# Patient Record
Sex: MALE | Race: WHITE | NOT HISPANIC OR LATINO
[De-identification: names, ages, dates, MRNs, and addresses within clinical notes are randomized per-mention and may not be internally consistent; named-entity substitution may affect disease eponyms.]

---

## 2017-01-03 ENCOUNTER — RX RENEWAL (OUTPATIENT)
Age: 57
End: 2017-01-03

## 2017-06-21 ENCOUNTER — APPOINTMENT (OUTPATIENT)
Dept: CARDIOLOGY | Facility: CLINIC | Age: 57
End: 2017-06-21

## 2017-06-21 ENCOUNTER — NON-APPOINTMENT (OUTPATIENT)
Age: 57
End: 2017-06-21

## 2017-06-21 VITALS
HEIGHT: 68 IN | DIASTOLIC BLOOD PRESSURE: 88 MMHG | WEIGHT: 220 LBS | HEART RATE: 65 BPM | OXYGEN SATURATION: 97 % | SYSTOLIC BLOOD PRESSURE: 135 MMHG | BODY MASS INDEX: 33.34 KG/M2

## 2017-06-21 LAB
ALBUMIN SERPL ELPH-MCNC: 4.4 G/DL
ALP BLD-CCNC: 60 U/L
ALT SERPL-CCNC: 48 U/L
ANION GAP SERPL CALC-SCNC: 20 MMOL/L
AST SERPL-CCNC: 26 U/L
BASOPHILS # BLD AUTO: 0.06 K/UL
BASOPHILS NFR BLD AUTO: 1 %
BILIRUB SERPL-MCNC: 0.6 MG/DL
BUN SERPL-MCNC: 19 MG/DL
CALCIUM SERPL-MCNC: 9.6 MG/DL
CHLORIDE SERPL-SCNC: 101 MMOL/L
CHOLEST SERPL-MCNC: 179 MG/DL
CHOLEST/HDLC SERPL: 3.2 RATIO
CO2 SERPL-SCNC: 24 MMOL/L
CREAT SERPL-MCNC: 0.99 MG/DL
EOSINOPHIL # BLD AUTO: 0.04 K/UL
EOSINOPHIL NFR BLD AUTO: 0.7 %
ESTIMATED AVERAGE GLUCOSE: 117 MG/DL
GLUCOSE SERPL-MCNC: 110 MG/DL
HBA1C MFR BLD HPLC: 5.7 %
HCT VFR BLD CALC: 45 %
HDLC SERPL-MCNC: 56 MG/DL
HGB BLD-MCNC: 14.8 G/DL
IMM GRANULOCYTES NFR BLD AUTO: 0.3 %
LDLC SERPL CALC-MCNC: 107 MG/DL
LYMPHOCYTES # BLD AUTO: 1.58 K/UL
LYMPHOCYTES NFR BLD AUTO: 27.5 %
MAN DIFF?: NORMAL
MCHC RBC-ENTMCNC: 28.8 PG
MCHC RBC-ENTMCNC: 32.9 GM/DL
MCV RBC AUTO: 87.7 FL
MONOCYTES # BLD AUTO: 0.34 K/UL
MONOCYTES NFR BLD AUTO: 5.9 %
NEUTROPHILS # BLD AUTO: 3.7 K/UL
NEUTROPHILS NFR BLD AUTO: 64.6 %
PLATELET # BLD AUTO: 203 K/UL
POTASSIUM SERPL-SCNC: 4.6 MMOL/L
PROT SERPL-MCNC: 7.1 G/DL
PSA FREE FLD-MCNC: 24.1
PSA FREE SERPL-MCNC: 0.19 NG/ML
PSA SERPL-MCNC: 0.79 NG/ML
RBC # BLD: 5.13 M/UL
RBC # FLD: 14.7 %
SODIUM SERPL-SCNC: 145 MMOL/L
T4 SERPL-MCNC: 7.5 UG/DL
TRIGL SERPL-MCNC: 79 MG/DL
TSH SERPL-ACNC: 0.94 UIU/ML
WBC # FLD AUTO: 5.74 K/UL

## 2017-09-04 ENCOUNTER — RX RENEWAL (OUTPATIENT)
Age: 57
End: 2017-09-04

## 2017-10-14 ENCOUNTER — RX RENEWAL (OUTPATIENT)
Age: 57
End: 2017-10-14

## 2017-10-25 ENCOUNTER — APPOINTMENT (OUTPATIENT)
Dept: CARDIOLOGY | Facility: CLINIC | Age: 57
End: 2017-10-25
Payer: MEDICARE

## 2017-10-25 ENCOUNTER — NON-APPOINTMENT (OUTPATIENT)
Age: 57
End: 2017-10-25

## 2017-10-25 VITALS
HEART RATE: 63 BPM | OXYGEN SATURATION: 99 % | WEIGHT: 224 LBS | DIASTOLIC BLOOD PRESSURE: 93 MMHG | HEIGHT: 68 IN | BODY MASS INDEX: 33.95 KG/M2 | SYSTOLIC BLOOD PRESSURE: 145 MMHG

## 2017-10-25 VITALS — DIASTOLIC BLOOD PRESSURE: 90 MMHG | SYSTOLIC BLOOD PRESSURE: 128 MMHG

## 2017-10-25 PROCEDURE — 93000 ELECTROCARDIOGRAM COMPLETE: CPT

## 2017-10-25 PROCEDURE — 99214 OFFICE O/P EST MOD 30 MIN: CPT

## 2017-10-25 PROCEDURE — G0008: CPT

## 2017-10-25 PROCEDURE — 90686 IIV4 VACC NO PRSV 0.5 ML IM: CPT

## 2017-11-06 ENCOUNTER — RX RENEWAL (OUTPATIENT)
Age: 57
End: 2017-11-06

## 2017-12-16 ENCOUNTER — RX RENEWAL (OUTPATIENT)
Age: 57
End: 2017-12-16

## 2018-02-28 ENCOUNTER — NON-APPOINTMENT (OUTPATIENT)
Age: 58
End: 2018-02-28

## 2018-02-28 ENCOUNTER — APPOINTMENT (OUTPATIENT)
Dept: CARDIOLOGY | Facility: CLINIC | Age: 58
End: 2018-02-28
Payer: MEDICARE

## 2018-02-28 VITALS
WEIGHT: 229 LBS | HEART RATE: 73 BPM | OXYGEN SATURATION: 100 % | HEIGHT: 68 IN | SYSTOLIC BLOOD PRESSURE: 125 MMHG | BODY MASS INDEX: 34.71 KG/M2 | DIASTOLIC BLOOD PRESSURE: 85 MMHG

## 2018-02-28 PROCEDURE — 99214 OFFICE O/P EST MOD 30 MIN: CPT

## 2018-02-28 PROCEDURE — 93000 ELECTROCARDIOGRAM COMPLETE: CPT

## 2018-03-01 LAB
ALBUMIN SERPL ELPH-MCNC: 4.5 G/DL
ALP BLD-CCNC: 65 U/L
ALT SERPL-CCNC: 63 U/L
ANION GAP SERPL CALC-SCNC: 15 MMOL/L
AST SERPL-CCNC: 32 U/L
BASOPHILS # BLD AUTO: 0.06 K/UL
BASOPHILS NFR BLD AUTO: 1 %
BILIRUB SERPL-MCNC: 0.5 MG/DL
BUN SERPL-MCNC: 16 MG/DL
CALCIUM SERPL-MCNC: 9.8 MG/DL
CHLORIDE SERPL-SCNC: 101 MMOL/L
CHOLEST SERPL-MCNC: 165 MG/DL
CHOLEST/HDLC SERPL: 3.3 RATIO
CO2 SERPL-SCNC: 27 MMOL/L
CREAT SERPL-MCNC: 1.08 MG/DL
EOSINOPHIL # BLD AUTO: 0.04 K/UL
EOSINOPHIL NFR BLD AUTO: 0.7 %
ESTIMATED AVERAGE GLUCOSE: 123 MG/DL
GLUCOSE SERPL-MCNC: 109 MG/DL
HBA1C MFR BLD HPLC: 5.9 %
HCT VFR BLD CALC: 46 %
HDLC SERPL-MCNC: 50 MG/DL
HGB BLD-MCNC: 15.1 G/DL
IMM GRANULOCYTES NFR BLD AUTO: 0.2 %
LDLC SERPL CALC-MCNC: 86 MG/DL
LYMPHOCYTES # BLD AUTO: 1.64 K/UL
LYMPHOCYTES NFR BLD AUTO: 28.4 %
MAN DIFF?: NORMAL
MCHC RBC-ENTMCNC: 29.4 PG
MCHC RBC-ENTMCNC: 32.8 GM/DL
MCV RBC AUTO: 89.7 FL
MONOCYTES # BLD AUTO: 0.3 K/UL
MONOCYTES NFR BLD AUTO: 5.2 %
NEUTROPHILS # BLD AUTO: 3.72 K/UL
NEUTROPHILS NFR BLD AUTO: 64.5 %
PLATELET # BLD AUTO: 180 K/UL
POTASSIUM SERPL-SCNC: 4.7 MMOL/L
PROT SERPL-MCNC: 7.2 G/DL
PSA FREE FLD-MCNC: 22.8
PSA FREE SERPL-MCNC: 0.21 NG/ML
PSA SERPL-MCNC: 0.92 NG/ML
RBC # BLD: 5.13 M/UL
RBC # FLD: 15.1 %
SODIUM SERPL-SCNC: 143 MMOL/L
T4 SERPL-MCNC: 7.5 UG/DL
TRIGL SERPL-MCNC: 146 MG/DL
TSH SERPL-ACNC: 1.68 UIU/ML
WBC # FLD AUTO: 5.77 K/UL

## 2018-07-02 ENCOUNTER — APPOINTMENT (OUTPATIENT)
Dept: CARDIOLOGY | Facility: CLINIC | Age: 58
End: 2018-07-02
Payer: MEDICARE

## 2018-07-02 ENCOUNTER — NON-APPOINTMENT (OUTPATIENT)
Age: 58
End: 2018-07-02

## 2018-07-02 VITALS
HEIGHT: 68 IN | BODY MASS INDEX: 34.71 KG/M2 | OXYGEN SATURATION: 97 % | WEIGHT: 229 LBS | DIASTOLIC BLOOD PRESSURE: 87 MMHG | SYSTOLIC BLOOD PRESSURE: 147 MMHG | HEART RATE: 69 BPM

## 2018-07-02 DIAGNOSIS — R07.89 OTHER CHEST PAIN: ICD-10-CM

## 2018-07-02 PROCEDURE — 99214 OFFICE O/P EST MOD 30 MIN: CPT

## 2018-07-02 PROCEDURE — 93000 ELECTROCARDIOGRAM COMPLETE: CPT

## 2018-07-17 ENCOUNTER — APPOINTMENT (OUTPATIENT)
Dept: CARDIOLOGY | Facility: CLINIC | Age: 58
End: 2018-07-17
Payer: MEDICARE

## 2018-07-17 PROCEDURE — 93015 CV STRESS TEST SUPVJ I&R: CPT

## 2018-12-10 ENCOUNTER — NON-APPOINTMENT (OUTPATIENT)
Age: 58
End: 2018-12-10

## 2018-12-10 ENCOUNTER — APPOINTMENT (OUTPATIENT)
Dept: CARDIOLOGY | Facility: CLINIC | Age: 58
End: 2018-12-10
Payer: MEDICARE

## 2018-12-10 VITALS
HEIGHT: 68 IN | BODY MASS INDEX: 34.56 KG/M2 | OXYGEN SATURATION: 97 % | HEART RATE: 94 BPM | WEIGHT: 228 LBS | SYSTOLIC BLOOD PRESSURE: 123 MMHG | TEMPERATURE: 100.2 F | DIASTOLIC BLOOD PRESSURE: 83 MMHG

## 2018-12-10 PROCEDURE — 93000 ELECTROCARDIOGRAM COMPLETE: CPT

## 2018-12-10 PROCEDURE — 99214 OFFICE O/P EST MOD 30 MIN: CPT

## 2018-12-10 NOTE — REVIEW OF SYSTEMS
[Negative] : Heme/Lymph [Fever] : fever [see HPI] : see HPI [Cough] : cough [Shortness Of Breath] : no shortness of breath [Chest Pain] : no chest pain [Palpitations] : no palpitations

## 2018-12-10 NOTE — DISCUSSION/SUMMARY
[FreeTextEntry1] : The patient was examined. His blood pressure was 123/83, his pulse was 94. His lungs were clear to auscultation. Cardiac exam was negative for murmurs rubs or gallops. His EKG showed sinus rhythm with a left axis deviation... No acute changes were seen. He will maintain all of his  medications.  The patient was told to  return in 4 months, or earlier if needed.

## 2018-12-10 NOTE — REASON FOR VISIT
[FreeTextEntry1] : The patient comes in for followup of his hypertension, hyperlipidemia, prediabetes, and abnormal liver function tests. He has low back pain. He denies any , shortness of breath, or palpitations. Had an episode of numbness on his arms and legs and he saw his neurologist. He was given gabapentin. He gets occasional sharp shooting chest pain. Last night he had chills. This morning he has a cough and a slight fever. His wife is also sick.

## 2018-12-10 NOTE — PHYSICAL EXAM
[General Appearance - Well Developed] : well developed [Normal Appearance] : normal appearance [Well Groomed] : well groomed [General Appearance - Well Nourished] : well nourished [No Deformities] : no deformities [General Appearance - In No Acute Distress] : no acute distress [Normal Conjunctiva] : the conjunctiva exhibited no abnormalities [Eyelids - No Xanthelasma] : the eyelids demonstrated no xanthelasmas [Normal Oral Mucosa] : normal oral mucosa [No Oral Pallor] : no oral pallor [No Oral Cyanosis] : no oral cyanosis [Normal Jugular Venous A Waves Present] : normal jugular venous A waves present [Normal Jugular Venous V Waves Present] : normal jugular venous V waves present [No Jugular Venous Worley A Waves] : no jugular venous worley A waves [Respiration, Rhythm And Depth] : normal respiratory rhythm and effort [Exaggerated Use Of Accessory Muscles For Inspiration] : no accessory muscle use [Auscultation Breath Sounds / Voice Sounds] : lungs were clear to auscultation bilaterally [Heart Rate And Rhythm] : heart rate and rhythm were normal [Heart Sounds] : normal S1 and S2 [Murmurs] : no murmurs present [Abdomen Soft] : soft [Abdomen Tenderness] : non-tender [Abdomen Mass (___ Cm)] : no abdominal mass palpated [Abnormal Walk] : normal gait [Gait - Sufficient For Exercise Testing] : the gait was sufficient for exercise testing [Nail Clubbing] : no clubbing of the fingernails [Cyanosis, Localized] : no localized cyanosis [Petechial Hemorrhages (___cm)] : no petechial hemorrhages [Skin Color & Pigmentation] : normal skin color and pigmentation [] : no rash [No Venous Stasis] : no venous stasis [Skin Lesions] : no skin lesions [No Skin Ulcers] : no skin ulcer [No Xanthoma] : no  xanthoma was observed [Oriented To Time, Place, And Person] : oriented to person, place, and time [Affect] : the affect was normal [Mood] : the mood was normal [No Anxiety] : not feeling anxious

## 2019-03-06 ENCOUNTER — MEDICATION RENEWAL (OUTPATIENT)
Age: 59
End: 2019-03-06

## 2019-03-07 ENCOUNTER — MEDICATION RENEWAL (OUTPATIENT)
Age: 59
End: 2019-03-07

## 2019-03-07 ENCOUNTER — RX RENEWAL (OUTPATIENT)
Age: 59
End: 2019-03-07

## 2019-04-15 ENCOUNTER — APPOINTMENT (OUTPATIENT)
Dept: CARDIOLOGY | Facility: CLINIC | Age: 59
End: 2019-04-15
Payer: MEDICARE

## 2019-04-15 ENCOUNTER — NON-APPOINTMENT (OUTPATIENT)
Age: 59
End: 2019-04-15

## 2019-04-15 VITALS
WEIGHT: 210 LBS | HEART RATE: 66 BPM | DIASTOLIC BLOOD PRESSURE: 75 MMHG | BODY MASS INDEX: 31.93 KG/M2 | OXYGEN SATURATION: 97 % | SYSTOLIC BLOOD PRESSURE: 133 MMHG

## 2019-04-15 PROCEDURE — 36415 COLL VENOUS BLD VENIPUNCTURE: CPT

## 2019-04-15 PROCEDURE — 99214 OFFICE O/P EST MOD 30 MIN: CPT

## 2019-04-15 PROCEDURE — 93000 ELECTROCARDIOGRAM COMPLETE: CPT

## 2019-04-15 NOTE — REVIEW OF SYSTEMS
[see HPI] : see HPI [Negative] : Heme/Lymph [Fever] : no fever [Shortness Of Breath] : no shortness of breath [Chest Pain] : no chest pain [Palpitations] : no palpitations [Cough] : no cough

## 2019-04-15 NOTE — DISCUSSION/SUMMARY
[FreeTextEntry1] : The patient was examined. His blood pressure was 133/75, his pulse was 965His lungs were clear to auscultation. Cardiac exam was negative for murmurs rubs or gallops. His EKG showed sinus rhythm with a left axis deviation. No acute changes were seen. He will maintain all of his  medications.  The patient was told to  return in 4 months, or earlier if needed.

## 2019-04-15 NOTE — PHYSICAL EXAM
[General Appearance - Well Developed] : well developed [Normal Appearance] : normal appearance [Well Groomed] : well groomed [General Appearance - Well Nourished] : well nourished [No Deformities] : no deformities [General Appearance - In No Acute Distress] : no acute distress [Eyelids - No Xanthelasma] : the eyelids demonstrated no xanthelasmas [Normal Conjunctiva] : the conjunctiva exhibited no abnormalities [Normal Oral Mucosa] : normal oral mucosa [No Oral Pallor] : no oral pallor [Normal Jugular Venous A Waves Present] : normal jugular venous A waves present [No Oral Cyanosis] : no oral cyanosis [Normal Jugular Venous V Waves Present] : normal jugular venous V waves present [No Jugular Venous Worley A Waves] : no jugular venous worley A waves [Respiration, Rhythm And Depth] : normal respiratory rhythm and effort [Heart Rate And Rhythm] : heart rate and rhythm were normal [Auscultation Breath Sounds / Voice Sounds] : lungs were clear to auscultation bilaterally [Exaggerated Use Of Accessory Muscles For Inspiration] : no accessory muscle use [Heart Sounds] : normal S1 and S2 [Murmurs] : no murmurs present [Abdomen Soft] : soft [Abdomen Tenderness] : non-tender [Abnormal Walk] : normal gait [Abdomen Mass (___ Cm)] : no abdominal mass palpated [Gait - Sufficient For Exercise Testing] : the gait was sufficient for exercise testing [Nail Clubbing] : no clubbing of the fingernails [Petechial Hemorrhages (___cm)] : no petechial hemorrhages [Cyanosis, Localized] : no localized cyanosis [] : no rash [Skin Color & Pigmentation] : normal skin color and pigmentation [Skin Lesions] : no skin lesions [No Venous Stasis] : no venous stasis [No Skin Ulcers] : no skin ulcer [No Xanthoma] : no  xanthoma was observed [Oriented To Time, Place, And Person] : oriented to person, place, and time [Affect] : the affect was normal [Mood] : the mood was normal [No Anxiety] : not feeling anxious

## 2019-04-15 NOTE — REASON FOR VISIT
[FreeTextEntry1] : The patient comes in for followup of his hypertension, hyperlipidemia, prediabetes, and abnormal liver function tests. He has low back pain. He denies any , shortness of breath, or palpitations. Had an episode of numbness on his arms and legs and he saw his neurologist. He was given gabapentin. He gets occasional sharp shooting chest pain. Last night he had chills. This morning he has a cough and a slight fever. His wife is also sick.\par April 15, 2019: He has intentionally lost weight. He has lost 19 pounds in the past 2 months. He did this by giving out carbohydrates and pasta. He feels much better.

## 2019-04-16 LAB
ALBUMIN SERPL ELPH-MCNC: 4.5 G/DL
ALP BLD-CCNC: 65 U/L
ALT SERPL-CCNC: 34 U/L
ANION GAP SERPL CALC-SCNC: 16 MMOL/L
AST SERPL-CCNC: 24 U/L
BASOPHILS # BLD AUTO: 0.07 K/UL
BASOPHILS NFR BLD AUTO: 1.2 %
BILIRUB SERPL-MCNC: 0.5 MG/DL
BUN SERPL-MCNC: 16 MG/DL
CALCIUM SERPL-MCNC: 9.4 MG/DL
CHLORIDE SERPL-SCNC: 103 MMOL/L
CHOLEST SERPL-MCNC: 144 MG/DL
CHOLEST/HDLC SERPL: 3.2 RATIO
CO2 SERPL-SCNC: 23 MMOL/L
CREAT SERPL-MCNC: 0.94 MG/DL
EOSINOPHIL # BLD AUTO: 0.03 K/UL
EOSINOPHIL NFR BLD AUTO: 0.5 %
ESTIMATED AVERAGE GLUCOSE: 117 MG/DL
GLUCOSE SERPL-MCNC: 103 MG/DL
HBA1C MFR BLD HPLC: 5.7 %
HCT VFR BLD CALC: 49.4 %
HDLC SERPL-MCNC: 45 MG/DL
HGB BLD-MCNC: 15 G/DL
IMM GRANULOCYTES NFR BLD AUTO: 0.5 %
LDLC SERPL CALC-MCNC: 71 MG/DL
LYMPHOCYTES # BLD AUTO: 1.54 K/UL
LYMPHOCYTES NFR BLD AUTO: 26.8 %
MAN DIFF?: NORMAL
MCHC RBC-ENTMCNC: 29.2 PG
MCHC RBC-ENTMCNC: 30.4 GM/DL
MCV RBC AUTO: 96.3 FL
MONOCYTES # BLD AUTO: 0.4 K/UL
MONOCYTES NFR BLD AUTO: 7 %
NEUTROPHILS # BLD AUTO: 3.67 K/UL
NEUTROPHILS NFR BLD AUTO: 64 %
PLATELET # BLD AUTO: 204 K/UL
POTASSIUM SERPL-SCNC: 4 MMOL/L
PROT SERPL-MCNC: 6.8 G/DL
PSA FREE FLD-MCNC: 26 %
PSA FREE SERPL-MCNC: 0.32 NG/ML
PSA SERPL-MCNC: 1.23 NG/ML
RBC # BLD: 5.13 M/UL
RBC # FLD: 14.6 %
SODIUM SERPL-SCNC: 142 MMOL/L
T4 SERPL-MCNC: 6.7 UG/DL
TRIGL SERPL-MCNC: 140 MG/DL
TSH SERPL-ACNC: 1.08 UIU/ML
WBC # FLD AUTO: 5.74 K/UL

## 2019-08-21 ENCOUNTER — NON-APPOINTMENT (OUTPATIENT)
Age: 59
End: 2019-08-21

## 2019-08-21 ENCOUNTER — APPOINTMENT (OUTPATIENT)
Dept: CARDIOLOGY | Facility: CLINIC | Age: 59
End: 2019-08-21
Payer: MEDICARE

## 2019-08-21 VITALS
HEIGHT: 68 IN | BODY MASS INDEX: 34.71 KG/M2 | DIASTOLIC BLOOD PRESSURE: 100 MMHG | OXYGEN SATURATION: 97 % | HEART RATE: 60 BPM | SYSTOLIC BLOOD PRESSURE: 159 MMHG | WEIGHT: 229 LBS

## 2019-08-21 VITALS — SYSTOLIC BLOOD PRESSURE: 152 MMHG | DIASTOLIC BLOOD PRESSURE: 100 MMHG

## 2019-08-21 DIAGNOSIS — R94.5 ABNORMAL RESULTS OF LIVER FUNCTION STUDIES: ICD-10-CM

## 2019-08-21 PROCEDURE — 93000 ELECTROCARDIOGRAM COMPLETE: CPT

## 2019-08-21 PROCEDURE — 99214 OFFICE O/P EST MOD 30 MIN: CPT

## 2019-08-21 NOTE — REVIEW OF SYSTEMS
[see HPI] : see HPI [Negative] : Endocrine [Cough] : cough [Fever] : no fever [Shortness Of Breath] : no shortness of breath [Chest Pain] : no chest pain [Palpitations] : no palpitations

## 2019-08-21 NOTE — REASON FOR VISIT
[FreeTextEntry1] : The patient comes in for followup of his hypertension, hyperlipidemia, prediabetes, and abnormal liver function tests. He has low back pain. He denies any , shortness of breath, or palpitations. Had an episode of numbness on his arms and legs and he saw his neurologist. He was given gabapentin. He gets occasional sharp shooting chest pain. Last night he had chills. This morning he has a cough and a slight fever. His wife is also sick.\par April 15, 2019: He has intentionally lost weight. He has lost 19 pounds in the past 2 months. He did this by giving out carbohydrates and pasta. He feels much better.\par August 21, 2019: The patient has been eating out a lot the past several weeks.  He has 1 to 2 cups of caffeinated coffee a day.  He denies any symptoms.  Specifically denies any chest pains, shortness of breath, or palpitations.  He does have a slight cough which does not bother him.

## 2019-08-21 NOTE — DISCUSSION/SUMMARY
[FreeTextEntry1] : The patient was examined. His blood pressure was 152/100, his pulse was 60. His lungs were clear to auscultation. Cardiac exam was negative for murmurs rubs or gallops. His EKG showed sinus rhythm with a left axis deviation. No acute changes were seen. He will maintain all of his  medications.  The patient will try to cut down on caffeine and salt.  Rather than adding a new medicine such as amlodipine the patient would prefer to be better on his diet and return in 1 month for blood pressure check.  The patient was told to  return in 1 month, or earlier if needed.

## 2019-08-21 NOTE — PHYSICAL EXAM
[General Appearance - Well Developed] : well developed [Normal Appearance] : normal appearance [Well Groomed] : well groomed [General Appearance - Well Nourished] : well nourished [No Deformities] : no deformities [General Appearance - In No Acute Distress] : no acute distress [Normal Conjunctiva] : the conjunctiva exhibited no abnormalities [Eyelids - No Xanthelasma] : the eyelids demonstrated no xanthelasmas [Normal Oral Mucosa] : normal oral mucosa [No Oral Pallor] : no oral pallor [No Oral Cyanosis] : no oral cyanosis [Normal Jugular Venous A Waves Present] : normal jugular venous A waves present [Normal Jugular Venous V Waves Present] : normal jugular venous V waves present [No Jugular Venous Worley A Waves] : no jugular venous worley A waves [Respiration, Rhythm And Depth] : normal respiratory rhythm and effort [Exaggerated Use Of Accessory Muscles For Inspiration] : no accessory muscle use [Auscultation Breath Sounds / Voice Sounds] : lungs were clear to auscultation bilaterally [Heart Rate And Rhythm] : heart rate and rhythm were normal [Heart Sounds] : normal S1 and S2 [Murmurs] : no murmurs present [Abdomen Soft] : soft [Abdomen Tenderness] : non-tender [Abdomen Mass (___ Cm)] : no abdominal mass palpated [Abnormal Walk] : normal gait [Gait - Sufficient For Exercise Testing] : the gait was sufficient for exercise testing [Cyanosis, Localized] : no localized cyanosis [Nail Clubbing] : no clubbing of the fingernails [Petechial Hemorrhages (___cm)] : no petechial hemorrhages [] : no rash [Skin Color & Pigmentation] : normal skin color and pigmentation [No Venous Stasis] : no venous stasis [Skin Lesions] : no skin lesions [No Skin Ulcers] : no skin ulcer [No Xanthoma] : no  xanthoma was observed [Mood] : the mood was normal [Oriented To Time, Place, And Person] : oriented to person, place, and time [Affect] : the affect was normal [No Anxiety] : not feeling anxious

## 2019-10-02 ENCOUNTER — APPOINTMENT (OUTPATIENT)
Dept: CARDIOLOGY | Facility: CLINIC | Age: 59
End: 2019-10-02
Payer: MEDICARE

## 2019-10-02 VITALS
BODY MASS INDEX: 34.1 KG/M2 | OXYGEN SATURATION: 99 % | WEIGHT: 225 LBS | DIASTOLIC BLOOD PRESSURE: 92 MMHG | HEIGHT: 68 IN | HEART RATE: 74 BPM | SYSTOLIC BLOOD PRESSURE: 128 MMHG

## 2019-10-02 VITALS — DIASTOLIC BLOOD PRESSURE: 90 MMHG | SYSTOLIC BLOOD PRESSURE: 130 MMHG

## 2019-10-02 PROCEDURE — 93000 ELECTROCARDIOGRAM COMPLETE: CPT

## 2019-10-02 PROCEDURE — 99214 OFFICE O/P EST MOD 30 MIN: CPT

## 2019-10-02 NOTE — PHYSICAL EXAM
[General Appearance - Well Developed] : well developed [Normal Appearance] : normal appearance [Well Groomed] : well groomed [General Appearance - Well Nourished] : well nourished [No Deformities] : no deformities [General Appearance - In No Acute Distress] : no acute distress [Normal Conjunctiva] : the conjunctiva exhibited no abnormalities [Normal Oral Mucosa] : normal oral mucosa [Eyelids - No Xanthelasma] : the eyelids demonstrated no xanthelasmas [No Oral Cyanosis] : no oral cyanosis [No Oral Pallor] : no oral pallor [Normal Jugular Venous A Waves Present] : normal jugular venous A waves present [Normal Jugular Venous V Waves Present] : normal jugular venous V waves present [No Jugular Venous Worley A Waves] : no jugular venous worley A waves [Respiration, Rhythm And Depth] : normal respiratory rhythm and effort [Exaggerated Use Of Accessory Muscles For Inspiration] : no accessory muscle use [Auscultation Breath Sounds / Voice Sounds] : lungs were clear to auscultation bilaterally [Heart Rate And Rhythm] : heart rate and rhythm were normal [Heart Sounds] : normal S1 and S2 [Murmurs] : no murmurs present [Abdomen Tenderness] : non-tender [Abdomen Soft] : soft [Abdomen Mass (___ Cm)] : no abdominal mass palpated [Abnormal Walk] : normal gait [Gait - Sufficient For Exercise Testing] : the gait was sufficient for exercise testing [Nail Clubbing] : no clubbing of the fingernails [Cyanosis, Localized] : no localized cyanosis [Petechial Hemorrhages (___cm)] : no petechial hemorrhages [Skin Color & Pigmentation] : normal skin color and pigmentation [] : no rash [No Venous Stasis] : no venous stasis [Skin Lesions] : no skin lesions [No Skin Ulcers] : no skin ulcer [No Xanthoma] : no  xanthoma was observed [Oriented To Time, Place, And Person] : oriented to person, place, and time [Affect] : the affect was normal [Mood] : the mood was normal [No Anxiety] : not feeling anxious

## 2019-10-02 NOTE — DISCUSSION/SUMMARY
[FreeTextEntry1] : The patient was examined. His blood pressure was 130/90, his pulse was 74. His lungs were clear to auscultation. Cardiac exam was negative for murmurs rubs or gallops. His EKG showed sinus rhythm with a left axis deviation. No acute changes were seen. He will maintain all of his  medications.  The patient will try to cut down on caffeine and salt.  Rather than adding a new medicine such as amlodipine the patient would prefer to increase his metoprolol to 75 mg in the morning and 50 at night and continue his other medication and return in 2 month for a blood pressure check.

## 2019-12-18 ENCOUNTER — NON-APPOINTMENT (OUTPATIENT)
Age: 59
End: 2019-12-18

## 2019-12-18 ENCOUNTER — APPOINTMENT (OUTPATIENT)
Dept: CARDIOLOGY | Facility: CLINIC | Age: 59
End: 2019-12-18
Payer: MEDICARE

## 2019-12-18 VITALS — BODY MASS INDEX: 34.97 KG/M2 | WEIGHT: 230 LBS

## 2019-12-18 VITALS
DIASTOLIC BLOOD PRESSURE: 85 MMHG | WEIGHT: 230 LBS | SYSTOLIC BLOOD PRESSURE: 132 MMHG | OXYGEN SATURATION: 100 % | HEIGHT: 68 IN | BODY MASS INDEX: 34.86 KG/M2 | HEART RATE: 52 BPM

## 2019-12-18 PROCEDURE — 99214 OFFICE O/P EST MOD 30 MIN: CPT

## 2019-12-18 PROCEDURE — 93000 ELECTROCARDIOGRAM COMPLETE: CPT

## 2019-12-18 NOTE — REASON FOR VISIT
[FreeTextEntry1] : The patient comes in for followup of his hypertension, hyperlipidemia, prediabetes, and abnormal liver function tests. He has low back pain. He denies any , shortness of breath, or palpitations. Had an episode of numbness on his arms and legs and he saw his neurologist. He was given gabapentin. He gets occasional sharp shooting chest pain. Last night he had chills. This morning he has a cough and a slight fever. His wife is also sick.\par April 15, 2019: He has intentionally lost weight. He has lost 19 pounds in the past 2 months. He did this by giving out carbohydrates and pasta. He feels much better.\par August 21, 2019: The patient has been eating out a lot the past several weeks.  He has 1 to 2 cups of caffeinated coffee a day.  He denies any symptoms.  Specifically denies any chest pains, shortness of breath, or palpitations.  He does have a slight cough which does not bother him.\par December 18 2019: the patient feels occasionally fuzzy since increasing his metoprolol.

## 2019-12-18 NOTE — REVIEW OF SYSTEMS
[see HPI] : see HPI [Negative] : Heme/Lymph [Fever] : no fever [Chest Pain] : no chest pain [Shortness Of Breath] : no shortness of breath [Cough] : no cough [Palpitations] : no palpitations

## 2019-12-18 NOTE — PHYSICAL EXAM
[General Appearance - Well Developed] : well developed [Normal Appearance] : normal appearance [Well Groomed] : well groomed [No Deformities] : no deformities [General Appearance - Well Nourished] : well nourished [General Appearance - In No Acute Distress] : no acute distress [Eyelids - No Xanthelasma] : the eyelids demonstrated no xanthelasmas [Normal Conjunctiva] : the conjunctiva exhibited no abnormalities [Normal Oral Mucosa] : normal oral mucosa [No Oral Pallor] : no oral pallor [No Oral Cyanosis] : no oral cyanosis [No Jugular Venous Worley A Waves] : no jugular venous worley A waves [Normal Jugular Venous V Waves Present] : normal jugular venous V waves present [Normal Jugular Venous A Waves Present] : normal jugular venous A waves present [Respiration, Rhythm And Depth] : normal respiratory rhythm and effort [Exaggerated Use Of Accessory Muscles For Inspiration] : no accessory muscle use [Auscultation Breath Sounds / Voice Sounds] : lungs were clear to auscultation bilaterally [Heart Rate And Rhythm] : heart rate and rhythm were normal [Abdomen Tenderness] : non-tender [Abdomen Soft] : soft [Heart Sounds] : normal S1 and S2 [Murmurs] : no murmurs present [Abdomen Mass (___ Cm)] : no abdominal mass palpated [Abnormal Walk] : normal gait [Gait - Sufficient For Exercise Testing] : the gait was sufficient for exercise testing [Nail Clubbing] : no clubbing of the fingernails [Cyanosis, Localized] : no localized cyanosis [Petechial Hemorrhages (___cm)] : no petechial hemorrhages [] : no rash [Skin Color & Pigmentation] : normal skin color and pigmentation [No Venous Stasis] : no venous stasis [No Skin Ulcers] : no skin ulcer [No Xanthoma] : no  xanthoma was observed [Skin Lesions] : no skin lesions [Oriented To Time, Place, And Person] : oriented to person, place, and time [Affect] : the affect was normal [Mood] : the mood was normal [No Anxiety] : not feeling anxious

## 2019-12-18 NOTE — DISCUSSION/SUMMARY
[FreeTextEntry1] : The patient was examined. His blood pressure was 132/85, and  his pulse was 52. His lungs were clear to auscultation. Cardiac exam was negative for murmurs rubs or gallops. His EKG showed sinus bradycardia,with normal QRS complexes.No acute changes were seen. He will maintain all of his  medications.  The patient will try to cut down on caffeine and salt.  Rather than adding a new medicine such as amlodipine we will continue his medication and return in 4 months .

## 2020-04-22 ENCOUNTER — APPOINTMENT (OUTPATIENT)
Dept: CARDIOLOGY | Facility: CLINIC | Age: 60
End: 2020-04-22

## 2020-07-28 ENCOUNTER — APPOINTMENT (OUTPATIENT)
Dept: CARDIOLOGY | Facility: CLINIC | Age: 60
End: 2020-07-28
Payer: MEDICARE

## 2020-07-28 ENCOUNTER — NON-APPOINTMENT (OUTPATIENT)
Age: 60
End: 2020-07-28

## 2020-07-28 VITALS
HEIGHT: 68 IN | TEMPERATURE: 97.9 F | WEIGHT: 234 LBS | OXYGEN SATURATION: 99 % | HEART RATE: 65 BPM | SYSTOLIC BLOOD PRESSURE: 146 MMHG | BODY MASS INDEX: 35.46 KG/M2 | DIASTOLIC BLOOD PRESSURE: 78 MMHG

## 2020-07-28 PROCEDURE — 99214 OFFICE O/P EST MOD 30 MIN: CPT

## 2020-07-28 PROCEDURE — 93000 ELECTROCARDIOGRAM COMPLETE: CPT

## 2020-07-28 NOTE — REASON FOR VISIT
[FreeTextEntry1] : The patient comes in for followup of his hypertension, hyperlipidemia, prediabetes, and abnormal liver function tests. He has low back pain. He denies any , shortness of breath, or palpitations. Had an episode of numbness on his arms and legs and he saw his neurologist. He was given gabapentin. He gets occasional sharp shooting chest pain. Last night he had chills. This morning he has a cough and a slight fever. His wife is also sick.\par April 15, 2019: He has intentionally lost weight. He has lost 19 pounds in the past 2 months. He did this by giving out carbohydrates and pasta. He feels much better.\par August 21, 2019: The patient has been eating out a lot the past several weeks.  He has 1 to 2 cups of caffeinated coffee a day.  He denies any symptoms.  Specifically denies any chest pains, shortness of breath, or palpitations.  He does have a slight cough which does not bother him.\par December 18 2019: the patient feels occasionally fuzzy since increasing his metoprolol.\par July 28, 2020: The patient has gained weight since the COVID-19 pandemic is kept him home.  He is eating more and exercising less.  He is upset about the weight gain.  He denies any chest pains, shortness of breath, or palpitations.

## 2020-07-28 NOTE — DISCUSSION/SUMMARY
[FreeTextEntry1] : The patient was examined. His blood pressure was 146/78, and  his pulse was 65. His lungs were clear to auscultation. Cardiac exam was negative for murmurs rubs or gallops. His EKG showed sinus rhythm,with normal QRS complexes.No acute changes were seen. He will maintain all of his  medications.  The patient will try to cut down on caffeine and salt.  Rather than adding a new medicine such as amlodipine we will continue his medication and return in 4 months .  He will try to walk more and to cut out carbohydrates.

## 2020-07-28 NOTE — PHYSICAL EXAM
[General Appearance - Well Developed] : well developed [Normal Appearance] : normal appearance [Well Groomed] : well groomed [No Deformities] : no deformities [General Appearance - Well Nourished] : well nourished [General Appearance - In No Acute Distress] : no acute distress [Normal Conjunctiva] : the conjunctiva exhibited no abnormalities [Eyelids - No Xanthelasma] : the eyelids demonstrated no xanthelasmas [Normal Oral Mucosa] : normal oral mucosa [No Oral Pallor] : no oral pallor [No Oral Cyanosis] : no oral cyanosis [Normal Jugular Venous A Waves Present] : normal jugular venous A waves present [Normal Jugular Venous V Waves Present] : normal jugular venous V waves present [No Jugular Venous Worley A Waves] : no jugular venous worley A waves [Respiration, Rhythm And Depth] : normal respiratory rhythm and effort [Exaggerated Use Of Accessory Muscles For Inspiration] : no accessory muscle use [Auscultation Breath Sounds / Voice Sounds] : lungs were clear to auscultation bilaterally [Heart Rate And Rhythm] : heart rate and rhythm were normal [Heart Sounds] : normal S1 and S2 [Murmurs] : no murmurs present [Abdomen Soft] : soft [Abdomen Tenderness] : non-tender [Abdomen Mass (___ Cm)] : no abdominal mass palpated [Abnormal Walk] : normal gait [Gait - Sufficient For Exercise Testing] : the gait was sufficient for exercise testing [Nail Clubbing] : no clubbing of the fingernails [Cyanosis, Localized] : no localized cyanosis [Petechial Hemorrhages (___cm)] : no petechial hemorrhages [] : no rash [Skin Color & Pigmentation] : normal skin color and pigmentation [No Venous Stasis] : no venous stasis [Skin Lesions] : no skin lesions [No Skin Ulcers] : no skin ulcer [No Xanthoma] : no  xanthoma was observed [Oriented To Time, Place, And Person] : oriented to person, place, and time [Affect] : the affect was normal [Mood] : the mood was normal [No Anxiety] : not feeling anxious

## 2020-07-28 NOTE — REVIEW OF SYSTEMS
[see HPI] : see HPI [Negative] : Heme/Lymph [Recent Weight Gain (___ Lbs)] : recent [unfilled] ~Ulb weight gain [Fever] : no fever [Shortness Of Breath] : no shortness of breath [Chest Pain] : no chest pain [Palpitations] : no palpitations [Cough] : no cough

## 2020-08-16 ENCOUNTER — NON-APPOINTMENT (OUTPATIENT)
Age: 60
End: 2020-08-16

## 2020-09-19 ENCOUNTER — RX RENEWAL (OUTPATIENT)
Age: 60
End: 2020-09-19

## 2020-11-17 ENCOUNTER — APPOINTMENT (OUTPATIENT)
Dept: CARDIOLOGY | Facility: CLINIC | Age: 60
End: 2020-11-17
Payer: MEDICARE

## 2020-11-17 ENCOUNTER — NON-APPOINTMENT (OUTPATIENT)
Age: 60
End: 2020-11-17

## 2020-11-17 VITALS
HEART RATE: 56 BPM | BODY MASS INDEX: 33.45 KG/M2 | SYSTOLIC BLOOD PRESSURE: 150 MMHG | OXYGEN SATURATION: 99 % | DIASTOLIC BLOOD PRESSURE: 91 MMHG | TEMPERATURE: 96 F | WEIGHT: 220 LBS

## 2020-11-17 VITALS — DIASTOLIC BLOOD PRESSURE: 92 MMHG | SYSTOLIC BLOOD PRESSURE: 140 MMHG

## 2020-11-17 DIAGNOSIS — Z20.828 CONTACT WITH AND (SUSPECTED) EXPOSURE TO OTHER VIRAL COMMUNICABLE DISEASES: ICD-10-CM

## 2020-11-17 PROCEDURE — 99214 OFFICE O/P EST MOD 30 MIN: CPT

## 2020-11-17 PROCEDURE — 93000 ELECTROCARDIOGRAM COMPLETE: CPT

## 2020-11-17 NOTE — REASON FOR VISIT
[FreeTextEntry1] : The patient comes in for followup of his hypertension, hyperlipidemia, prediabetes, and abnormal liver function tests. He has low back pain. He denies any , shortness of breath, or palpitations. Had an episode of numbness on his arms and legs and he saw his neurologist. He was given gabapentin. He gets occasional sharp shooting chest pain. Last night he had chills. This morning he has a cough and a slight fever. His wife is also sick.\par April 15, 2019: He has intentionally lost weight. He has lost 19 pounds in the past 2 months. He did this by giving out carbohydrates and pasta. He feels much better.\par August 21, 2019: The patient has been eating out a lot the past several weeks.  He has 1 to 2 cups of caffeinated coffee a day.  He denies any symptoms.  Specifically denies any chest pains, shortness of breath, or palpitations.  He does have a slight cough which does not bother him.\par December 18 2019: the patient feels occasionally fuzzy since increasing his metoprolol.\par July 28, 2020: The patient has gained weight since the COVID-19 pandemic is kept him home.  He is eating more and exercising less.  He is upset about the weight gain.  He denies any chest pains, shortness of breath, or palpitations.\par November 17, 2020: The patient has had an intentional weight loss of 10 pounds.  He is using low glucose boost supplements.  He denies any chest pains, shortness of breath, or palpitations.

## 2020-11-17 NOTE — REVIEW OF SYSTEMS
[see HPI] : see HPI [Negative] : Heme/Lymph [Recent Weight Loss (___ Lbs)] : recent [unfilled] ~Ulb weight loss [Fever] : no fever [Recent Weight Gain (___ Lbs)] : no recent weight gain [Shortness Of Breath] : no shortness of breath [Chest Pain] : no chest pain [Palpitations] : no palpitations [Cough] : no cough

## 2020-11-17 NOTE — DISCUSSION/SUMMARY
[FreeTextEntry1] : The patient was examined. His blood pressure was 140/92, and  his pulse was 56. His lungs were clear to auscultation. Cardiac exam was negative for murmurs rubs or gallops. His EKG showed sinus bradycardia,with normal QRS complexes.No acute changes were seen. He will maintain all of his  medications.  The patient will try to cut down on caffeine and salt.  Rather than adding a new medicine such as amlodipine we will continue his medication and return in 4 months .  He will try to walk more and to cut out carbohydrates.

## 2020-11-18 LAB
ALBUMIN SERPL ELPH-MCNC: 4.6 G/DL
ALP BLD-CCNC: 73 U/L
ALT SERPL-CCNC: 63 U/L
ANION GAP SERPL CALC-SCNC: 13 MMOL/L
AST SERPL-CCNC: 32 U/L
BASOPHILS # BLD AUTO: 0.07 K/UL
BASOPHILS NFR BLD AUTO: 1.1 %
BILIRUB SERPL-MCNC: 0.6 MG/DL
BUN SERPL-MCNC: 17 MG/DL
CALCIUM SERPL-MCNC: 9.5 MG/DL
CHLORIDE SERPL-SCNC: 99 MMOL/L
CHOLEST SERPL-MCNC: 136 MG/DL
CO2 SERPL-SCNC: 27 MMOL/L
CREAT SERPL-MCNC: 0.9 MG/DL
EOSINOPHIL # BLD AUTO: 0.09 K/UL
EOSINOPHIL NFR BLD AUTO: 1.4 %
ESTIMATED AVERAGE GLUCOSE: 120 MG/DL
GLUCOSE SERPL-MCNC: 97 MG/DL
HBA1C MFR BLD HPLC: 5.8 %
HCT VFR BLD CALC: 47 %
HDLC SERPL-MCNC: 49 MG/DL
HGB BLD-MCNC: 14.6 G/DL
IMM GRANULOCYTES NFR BLD AUTO: 0.6 %
LDLC SERPL CALC-MCNC: 71 MG/DL
LYMPHOCYTES # BLD AUTO: 1.61 K/UL
LYMPHOCYTES NFR BLD AUTO: 24.2 %
MAN DIFF?: NORMAL
MCHC RBC-ENTMCNC: 29.7 PG
MCHC RBC-ENTMCNC: 31.1 GM/DL
MCV RBC AUTO: 95.5 FL
MONOCYTES # BLD AUTO: 0.56 K/UL
MONOCYTES NFR BLD AUTO: 8.4 %
NEUTROPHILS # BLD AUTO: 4.28 K/UL
NEUTROPHILS NFR BLD AUTO: 64.3 %
NONHDLC SERPL-MCNC: 87 MG/DL
PLATELET # BLD AUTO: 178 K/UL
POTASSIUM SERPL-SCNC: 4.1 MMOL/L
PROT SERPL-MCNC: 6.7 G/DL
PSA FREE FLD-MCNC: 23 %
PSA FREE SERPL-MCNC: 0.22 NG/ML
PSA SERPL-MCNC: 0.95 NG/ML
RBC # BLD: 4.92 M/UL
RBC # FLD: 14.6 %
SARS-COV-2 IGG SERPL IA-ACNC: 0.09 INDEX
SARS-COV-2 IGG SERPL QL IA: NEGATIVE
SODIUM SERPL-SCNC: 140 MMOL/L
T4 SERPL-MCNC: 7.7 UG/DL
TRIGL SERPL-MCNC: 78 MG/DL
TSH SERPL-ACNC: 1.22 UIU/ML
WBC # FLD AUTO: 6.65 K/UL

## 2021-03-16 ENCOUNTER — APPOINTMENT (OUTPATIENT)
Dept: CARDIOLOGY | Facility: CLINIC | Age: 61
End: 2021-03-16
Payer: MEDICARE

## 2021-03-16 ENCOUNTER — NON-APPOINTMENT (OUTPATIENT)
Age: 61
End: 2021-03-16

## 2021-03-16 VITALS
TEMPERATURE: 97 F | HEART RATE: 59 BPM | BODY MASS INDEX: 34.71 KG/M2 | OXYGEN SATURATION: 99 % | SYSTOLIC BLOOD PRESSURE: 161 MMHG | DIASTOLIC BLOOD PRESSURE: 90 MMHG | WEIGHT: 229 LBS | RESPIRATION RATE: 17 BRPM | HEIGHT: 68 IN

## 2021-03-16 VITALS — DIASTOLIC BLOOD PRESSURE: 86 MMHG | SYSTOLIC BLOOD PRESSURE: 154 MMHG

## 2021-03-16 DIAGNOSIS — R73.03 PREDIABETES.: ICD-10-CM

## 2021-03-16 PROCEDURE — 93000 ELECTROCARDIOGRAM COMPLETE: CPT

## 2021-03-16 PROCEDURE — 99214 OFFICE O/P EST MOD 30 MIN: CPT

## 2021-03-16 NOTE — REASON FOR VISIT
[FreeTextEntry1] : The patient comes in for followup of his hypertension, hyperlipidemia, prediabetes, and abnormal liver function tests. He has low back pain. He denies any , shortness of breath, or palpitations. Had an episode of numbness on his arms and legs and he saw his neurologist. He was given gabapentin. He gets occasional sharp shooting chest pain. Last night he had chills. This morning he has a cough and a slight fever. His wife is also sick.\par April 15, 2019: He has intentionally lost weight. He has lost 19 pounds in the past 2 months. He did this by giving out carbohydrates and pasta. He feels much better.\par August 21, 2019: The patient has been eating out a lot the past several weeks.  He has 1 to 2 cups of caffeinated coffee a day.  He denies any symptoms.  Specifically denies any chest pains, shortness of breath, or palpitations.  He does have a slight cough which does not bother him.\par December 18 2019: the patient feels occasionally fuzzy since increasing his metoprolol.\par July 28, 2020: The patient has gained weight since the COVID-19 pandemic is kept him home.  He is eating more and exercising less.  He is upset about the weight gain.  He denies any chest pains, shortness of breath, or palpitations.\par November 17, 2020: The patient has had an intentional weight loss of 10 pounds.  He is using low glucose boost supplements.  He denies any chest pains, shortness of breath, or palpitations.\par March 16, 2021: The patient has no new complaints.  He denies any shortness of breath, palpitations, or chest pains.\par

## 2021-03-16 NOTE — REVIEW OF SYSTEMS
[Recent Weight Loss (___ Lbs)] : recent [unfilled] ~Ulb weight loss [see HPI] : see HPI [Negative] : Heme/Lymph [Fever] : no fever [Recent Weight Gain (___ Lbs)] : no recent weight gain [Shortness Of Breath] : no shortness of breath [Chest Pain] : no chest pain [Palpitations] : no palpitations [Cough] : no cough

## 2021-03-16 NOTE — DISCUSSION/SUMMARY
[FreeTextEntry1] : The patient was examined. His blood pressure was 154/86 and  his pulse was 59  His lungs were clear to auscultation. Cardiac exam was negative for murmurs rubs or gallops. His EKG showed sinus bradycardia,with normal QRS complexes.No acute changes were seen. He will maintain all of his  medications, and we will increase the benazepril to 40 mg at night.  The patient will try to cut down on caffeine and salt.   and return in 4 months .  He will try to walk more and to cut out carbohydrates.  Please note, I spent a total of 34 minutes on the date of the encounter evaluating and treating this patient.

## 2021-07-20 ENCOUNTER — NON-APPOINTMENT (OUTPATIENT)
Age: 61
End: 2021-07-20

## 2021-07-20 ENCOUNTER — APPOINTMENT (OUTPATIENT)
Dept: CARDIOLOGY | Facility: CLINIC | Age: 61
End: 2021-07-20
Payer: MEDICARE

## 2021-07-20 VITALS
WEIGHT: 230 LBS | DIASTOLIC BLOOD PRESSURE: 85 MMHG | SYSTOLIC BLOOD PRESSURE: 129 MMHG | OXYGEN SATURATION: 99 % | HEART RATE: 60 BPM | BODY MASS INDEX: 34.97 KG/M2

## 2021-07-20 PROCEDURE — 99214 OFFICE O/P EST MOD 30 MIN: CPT

## 2021-07-20 PROCEDURE — 93000 ELECTROCARDIOGRAM COMPLETE: CPT

## 2021-07-20 NOTE — REASON FOR VISIT
[FreeTextEntry1] : The patient comes in for followup of his hypertension, hyperlipidemia, prediabetes, and abnormal liver function tests. He has low back pain. He denies any , shortness of breath, or palpitations. Had an episode of numbness on his arms and legs and he saw his neurologist. He was given gabapentin. He gets occasional sharp shooting chest pain. Last night he had chills. This morning he has a cough and a slight fever. His wife is also sick.\par April 15, 2019: He has intentionally lost weight. He has lost 19 pounds in the past 2 months. He did this by giving out carbohydrates and pasta. He feels much better.\par August 21, 2019: The patient has been eating out a lot the past several weeks.  He has 1 to 2 cups of caffeinated coffee a day.  He denies any symptoms.  Specifically denies any chest pains, shortness of breath, or palpitations.  He does have a slight cough which does not bother him.\par December 18 2019: the patient feels occasionally fuzzy since increasing his metoprolol.\par July 28, 2020: The patient has gained weight since the COVID-19 pandemic is kept him home.  He is eating more and exercising less.  He is upset about the weight gain.  He denies any chest pains, shortness of breath, or palpitations.\par November 17, 2020: The patient has had an intentional weight loss of 10 pounds.  He is using low glucose boost supplements.  He denies any chest pains, shortness of breath, or palpitations.\par March 16, 2021: The patient has no new complaints.  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  It does distort his vision a little in that eye.  He also is complaining of some dyspnea on exertion.  He denies any chest pains, shortness of breath at rest, or palpitations.\par

## 2021-07-20 NOTE — PHYSICAL EXAM
[General Appearance - Well Developed] : well developed [Normal Appearance] : normal appearance [Well Groomed] : well groomed [General Appearance - Well Nourished] : well nourished [No Deformities] : no deformities [General Appearance - In No Acute Distress] : no acute distress [Normal Conjunctiva] : the conjunctiva exhibited no abnormalities [Eyelids - No Xanthelasma] : the eyelids demonstrated no xanthelasmas [Normal Oral Mucosa] : normal oral mucosa [No Oral Pallor] : no oral pallor [No Oral Cyanosis] : no oral cyanosis [Normal Jugular Venous A Waves Present] : normal jugular venous A waves present [Normal Jugular Venous V Waves Present] : normal jugular venous V waves present [No Jugular Venous Worley A Waves] : no jugular venous worley A waves [Respiration, Rhythm And Depth] : normal respiratory rhythm and effort [Exaggerated Use Of Accessory Muscles For Inspiration] : no accessory muscle use [Auscultation Breath Sounds / Voice Sounds] : lungs were clear to auscultation bilaterally [Heart Rate And Rhythm] : heart rate and rhythm were normal [Heart Sounds] : normal S1 and S2 [Murmurs] : no murmurs present [Abdomen Soft] : soft [Abdomen Tenderness] : non-tender [Abdomen Mass (___ Cm)] : no abdominal mass palpated [Abnormal Walk] : normal gait [Gait - Sufficient For Exercise Testing] : the gait was sufficient for exercise testing [Nail Clubbing] : no clubbing of the fingernails [Cyanosis, Localized] : no localized cyanosis [Petechial Hemorrhages (___cm)] : no petechial hemorrhages [Skin Color & Pigmentation] : normal skin color and pigmentation [] : no rash [No Venous Stasis] : no venous stasis [No Skin Ulcers] : no skin ulcer [Skin Lesions] : no skin lesions [No Xanthoma] : no  xanthoma was observed [Oriented To Time, Place, And Person] : oriented to person, place, and time [Affect] : the affect was normal [Mood] : the mood was normal [No Anxiety] : not feeling anxious

## 2021-07-20 NOTE — REVIEW OF SYSTEMS
[Dyspnea on exertion] : dyspnea during exertion [Negative] : Psychiatric [SOB] : no shortness of breath [Chest Discomfort] : no chest discomfort [Palpitations] : no palpitations

## 2021-07-20 NOTE — DISCUSSION/SUMMARY
[FreeTextEntry1] : The patient was examined. His blood pressure was 129/85 and  his pulse was 60  His lungs were clear to auscultation. Cardiac exam was negative for murmurs rubs or gallops. His EKG showed sinus bradycardia,with normal QRS complexes.No acute changes were seen. He will maintain all of his  medications.  The patient will try to cut down on caffeine and salt.   and return in 4 months .  He will try to walk more and to cut out carbohydrates.  Because of dyspnea on exertion which may be an anginal equivalent we will send the patient for treadmill stress test and an echocardiogram.  Total time spent on the day of the encounter was 33 minutes which includes  face-to-face and non face-to-face times personally spent by the physician preparing to see the patient, obtaining  separately obtained history, performing a medically appropriate exam and evaluation, counseling, educating, talking to the family or caregivers, ordering medicines, ordering tests or procedures, referring and communicating with other healthcare foods professionals, and documenting clinical information in the electronic health record.

## 2021-07-28 ENCOUNTER — APPOINTMENT (OUTPATIENT)
Dept: CARDIOLOGY | Facility: CLINIC | Age: 61
End: 2021-07-28
Payer: MEDICARE

## 2021-07-28 PROCEDURE — 93306 TTE W/DOPPLER COMPLETE: CPT

## 2021-09-01 ENCOUNTER — APPOINTMENT (OUTPATIENT)
Dept: CARDIOLOGY | Facility: CLINIC | Age: 61
End: 2021-09-01

## 2021-09-29 ENCOUNTER — RX RENEWAL (OUTPATIENT)
Age: 61
End: 2021-09-29

## 2021-11-20 ENCOUNTER — NON-APPOINTMENT (OUTPATIENT)
Age: 61
End: 2021-11-20

## 2021-11-23 ENCOUNTER — NON-APPOINTMENT (OUTPATIENT)
Age: 61
End: 2021-11-23

## 2021-11-23 ENCOUNTER — APPOINTMENT (OUTPATIENT)
Dept: CARDIOLOGY | Facility: CLINIC | Age: 61
End: 2021-11-23
Payer: MEDICARE

## 2021-11-23 VITALS
HEART RATE: 60 BPM | SYSTOLIC BLOOD PRESSURE: 149 MMHG | OXYGEN SATURATION: 99 % | DIASTOLIC BLOOD PRESSURE: 78 MMHG | WEIGHT: 232 LBS | BODY MASS INDEX: 35.28 KG/M2

## 2021-11-23 VITALS — DIASTOLIC BLOOD PRESSURE: 84 MMHG | SYSTOLIC BLOOD PRESSURE: 142 MMHG

## 2021-11-23 PROCEDURE — 99214 OFFICE O/P EST MOD 30 MIN: CPT

## 2021-11-23 PROCEDURE — 93000 ELECTROCARDIOGRAM COMPLETE: CPT

## 2021-11-23 NOTE — REASON FOR VISIT
[FreeTextEntry1] : The patient comes in for followup of his hypertension, hyperlipidemia, prediabetes, and abnormal liver function tests. He has low back pain. He denies any , shortness of breath, or palpitations. Had an episode of numbness on his arms and legs and he saw his neurologist. He was given gabapentin. He gets occasional sharp shooting chest pain. Last night he had chills. This morning he has a cough and a slight fever. His wife is also sick.\par April 15, 2019: He has intentionally lost weight. He has lost 19 pounds in the past 2 months. He did this by giving out carbohydrates and pasta. He feels much better.\par August 21, 2019: The patient has been eating out a lot the past several weeks.  He has 1 to 2 cups of caffeinated coffee a day.  He denies any symptoms.  Specifically denies any chest pains, shortness of breath, or palpitations.  He does have a slight cough which does not bother him.\par December 18 2019: the patient feels occasionally fuzzy since increasing his metoprolol.\par July 28, 2020: The patient has gained weight since the COVID-19 pandemic is kept him home.  He is eating more and exercising less.  He is upset about the weight gain.  He denies any chest pains, shortness of breath, or palpitations.\par November 17, 2020: The patient has had an intentional weight loss of 10 pounds.  He is using low glucose boost supplements.  He denies any chest pains, shortness of breath, or palpitations.\par March 16, 2021: The patient has no new complaints.  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  It does distort his vision a little in that eye.  He also is complaining of some dyspnea on exertion.  He denies any chest pains, shortness of breath at rest, or palpitations.\par November 23, 2021: Patient feels well.  He accidentally did not take the increased dose of his blood pressure medicines because he was away.  He denies any chest pains, shortness of breath, or palpitations.  He never went for his stress test.  He wishes to still go.\par

## 2021-11-23 NOTE — DISCUSSION/SUMMARY
[FreeTextEntry1] : The patient was examined. His blood pressure was 142/84 and  his pulse was 60  His lungs were clear to auscultation. Cardiac exam was negative for murmurs rubs or gallops. His EKG showed sinus bradycardia,with normal QRS complexes.No acute changes were seen. He will maintain all of his  medications.  The patient will try to cut down on caffeine and salt. and return in 3 months .  He will try to walk more and to cut out carbohydrates.  Because of dyspnea on exertion which may be an anginal equivalent we will send the patient for treadmill stress test .  Total time spent on the day of the encounter was 34 minutes which includes  face-to-face and non face-to-face times personally spent by the physician preparing to see the patient, obtaining  separately obtained history, performing a medically appropriate exam and evaluation, counseling, educating, talking to the family or caregivers, ordering medicines, ordering tests or procedures, referring and communicating with other healthcare professionals, and documenting clinical information in the electronic health record.

## 2021-12-09 ENCOUNTER — APPOINTMENT (OUTPATIENT)
Dept: CARDIOLOGY | Facility: CLINIC | Age: 61
End: 2021-12-09

## 2022-01-03 ENCOUNTER — APPOINTMENT (OUTPATIENT)
Dept: CARDIOLOGY | Facility: CLINIC | Age: 62
End: 2022-01-03

## 2022-02-23 ENCOUNTER — LABORATORY RESULT (OUTPATIENT)
Age: 62
End: 2022-02-23

## 2022-02-23 ENCOUNTER — APPOINTMENT (OUTPATIENT)
Dept: CARDIOLOGY | Facility: CLINIC | Age: 62
End: 2022-02-23
Payer: MEDICARE

## 2022-02-23 ENCOUNTER — NON-APPOINTMENT (OUTPATIENT)
Age: 62
End: 2022-02-23

## 2022-02-23 VITALS
BODY MASS INDEX: 36.04 KG/M2 | WEIGHT: 237 LBS | OXYGEN SATURATION: 98 % | HEART RATE: 62 BPM | SYSTOLIC BLOOD PRESSURE: 148 MMHG | DIASTOLIC BLOOD PRESSURE: 87 MMHG

## 2022-02-23 VITALS — SYSTOLIC BLOOD PRESSURE: 142 MMHG | DIASTOLIC BLOOD PRESSURE: 86 MMHG

## 2022-02-23 DIAGNOSIS — R00.1 BRADYCARDIA, UNSPECIFIED: ICD-10-CM

## 2022-02-23 LAB
ALBUMIN SERPL ELPH-MCNC: 4.5 G/DL
ALP BLD-CCNC: 73 U/L
ALT SERPL-CCNC: 53 U/L
ANION GAP SERPL CALC-SCNC: 13 MMOL/L
AST SERPL-CCNC: 26 U/L
BASOPHILS # BLD AUTO: 0.06 K/UL
BASOPHILS NFR BLD AUTO: 1.1 %
BILIRUB SERPL-MCNC: 0.4 MG/DL
BUN SERPL-MCNC: 15 MG/DL
CALCIUM SERPL-MCNC: 9.7 MG/DL
CHLORIDE SERPL-SCNC: 104 MMOL/L
CHOLEST SERPL-MCNC: 156 MG/DL
CO2 SERPL-SCNC: 26 MMOL/L
CREAT SERPL-MCNC: 0.89 MG/DL
EOSINOPHIL # BLD AUTO: 0.04 K/UL
EOSINOPHIL NFR BLD AUTO: 0.7 %
ESTIMATED AVERAGE GLUCOSE: 123 MG/DL
GLUCOSE SERPL-MCNC: 110 MG/DL
HBA1C MFR BLD HPLC: 5.9 %
HCT VFR BLD CALC: 45.4 %
HDLC SERPL-MCNC: 48 MG/DL
HGB BLD-MCNC: 14.8 G/DL
IMM GRANULOCYTES NFR BLD AUTO: 0.7 %
LDLC SERPL CALC-MCNC: 75 MG/DL
LYMPHOCYTES # BLD AUTO: 1.13 K/UL
LYMPHOCYTES NFR BLD AUTO: 21 %
MAN DIFF?: NORMAL
MCHC RBC-ENTMCNC: 29.9 PG
MCHC RBC-ENTMCNC: 32.6 GM/DL
MCV RBC AUTO: 91.7 FL
MONOCYTES # BLD AUTO: 0.41 K/UL
MONOCYTES NFR BLD AUTO: 7.6 %
NEUTROPHILS # BLD AUTO: 3.71 K/UL
NEUTROPHILS NFR BLD AUTO: 68.9 %
NONHDLC SERPL-MCNC: 107 MG/DL
PLATELET # BLD AUTO: 186 K/UL
POTASSIUM SERPL-SCNC: 4.2 MMOL/L
PROT SERPL-MCNC: 6.6 G/DL
PSA FREE FLD-MCNC: 24 %
PSA FREE SERPL-MCNC: 0.29 NG/ML
PSA SERPL-MCNC: 1.22 NG/ML
RBC # BLD: 4.95 M/UL
RBC # FLD: 14 %
SODIUM SERPL-SCNC: 143 MMOL/L
T3RU NFR SERPL: 1.1 TBI
T4 SERPL-MCNC: 7.5 UG/DL
TRIGL SERPL-MCNC: 159 MG/DL
TSH SERPL-ACNC: 1.09 UIU/ML
WBC # FLD AUTO: 5.39 K/UL

## 2022-02-23 PROCEDURE — 99214 OFFICE O/P EST MOD 30 MIN: CPT

## 2022-02-23 PROCEDURE — 93000 ELECTROCARDIOGRAM COMPLETE: CPT

## 2022-02-23 NOTE — REVIEW OF SYSTEMS
[Dyspnea on exertion] : dyspnea during exertion [Negative] : Heme/Lymph [SOB] : no shortness of breath [Chest Discomfort] : no chest discomfort [Palpitations] : no palpitations

## 2022-02-23 NOTE — PHYSICAL EXAM
[General Appearance - Well Developed] : well developed [Normal Appearance] : normal appearance [Well Groomed] : well groomed [General Appearance - Well Nourished] : well nourished [No Deformities] : no deformities [General Appearance - In No Acute Distress] : no acute distress [Normal Conjunctiva] : the conjunctiva exhibited no abnormalities [Eyelids - No Xanthelasma] : the eyelids demonstrated no xanthelasmas [Normal Oral Mucosa] : normal oral mucosa [No Oral Pallor] : no oral pallor [No Oral Cyanosis] : no oral cyanosis [Normal Jugular Venous A Waves Present] : normal jugular venous A waves present [Normal Jugular Venous V Waves Present] : normal jugular venous V waves present [No Jugular Venous Worley A Waves] : no jugular venous worley A waves [Respiration, Rhythm And Depth] : normal respiratory rhythm and effort [Exaggerated Use Of Accessory Muscles For Inspiration] : no accessory muscle use [Auscultation Breath Sounds / Voice Sounds] : lungs were clear to auscultation bilaterally [Heart Rate And Rhythm] : heart rate and rhythm were normal [Heart Sounds] : normal S1 and S2 [Murmurs] : no murmurs present [Abdomen Soft] : soft [Abdomen Tenderness] : non-tender [Abdomen Mass (___ Cm)] : no abdominal mass palpated [Abnormal Walk] : normal gait [Gait - Sufficient For Exercise Testing] : the gait was sufficient for exercise testing [Nail Clubbing] : no clubbing of the fingernails [Cyanosis, Localized] : no localized cyanosis [Petechial Hemorrhages (___cm)] : no petechial hemorrhages [] : no rash [Skin Color & Pigmentation] : normal skin color and pigmentation [No Venous Stasis] : no venous stasis [Skin Lesions] : no skin lesions [No Skin Ulcers] : no skin ulcer [No Xanthoma] : no  xanthoma was observed [Oriented To Time, Place, And Person] : oriented to person, place, and time [Affect] : the affect was normal [Mood] : the mood was normal [No Anxiety] : not feeling anxious

## 2022-02-23 NOTE — DISCUSSION/SUMMARY
[FreeTextEntry1] : The patient was examined. His blood pressure was 142/86 and  his pulse was 62  His lungs were clear to auscultation. Cardiac exam was negative for murmurs rubs or gallops. His EKG showed sinus bradycardia,with normal QRS complexes.No acute changes were seen. He will maintain all of his  medications.  The patient will try to cut down on caffeine and salt. and return in 3 months .  He will try to walk more and to cut out carbohydrates.  Because of dyspnea on exertion which may be an anginal equivalent we will send the patient for treadmill stress test .  Total time spent on the day of the encounter was 33 minutes which includes  face-to-face and non face-to-face times personally spent by the physician preparing to see the patient, obtaining  separately obtained history, performing a medically appropriate exam and evaluation, counseling, educating, talking to the family or caregivers, ordering medicines, ordering tests or procedures, referring and communicating with other healthcare professionals, and documenting clinical information in the electronic health record.

## 2022-02-23 NOTE — REASON FOR VISIT
[FreeTextEntry1] : The patient comes in for followup of his hypertension, hyperlipidemia, prediabetes, and abnormal liver function tests. He has low back pain. He denies any , shortness of breath, or palpitations. Had an episode of numbness on his arms and legs and he saw his neurologist. He was given gabapentin. He gets occasional sharp shooting chest pain. Last night he had chills. This morning he has a cough and a slight fever. His wife is also sick.\par April 15, 2019: He has intentionally lost weight. He has lost 19 pounds in the past 2 months. He did this by giving out carbohydrates and pasta. He feels much better.\par August 21, 2019: The patient has been eating out a lot the past several weeks.  He has 1 to 2 cups of caffeinated coffee a day.  He denies any symptoms.  Specifically denies any chest pains, shortness of breath, or palpitations.  He does have a slight cough which does not bother him.\par December 18 2019: the patient feels occasionally fuzzy since increasing his metoprolol.\par July 28, 2020: The patient has gained weight since the COVID-19 pandemic is kept him home.  He is eating more and exercising less.  He is upset about the weight gain.  He denies any chest pains, shortness of breath, or palpitations.\par November 17, 2020: The patient has had an intentional weight loss of 10 pounds.  He is using low glucose boost supplements.  He denies any chest pains, shortness of breath, or palpitations.\par March 16, 2021: The patient has no new complaints.  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  It does distort his vision a little in that eye.  He also is complaining of some dyspnea on exertion.  He denies any chest pains, shortness of breath at rest, or palpitations.\par November 23, 2021: Patient feels well.  He accidentally did not take the increased dose of his blood pressure medicines because he was away.  He denies any chest pains, shortness of breath, or palpitations.  He never went for his stress test.  He wishes to still go.\par 2/23/2022: The patient never went for his stress test because he had a COVID-19 infection in December 22 and it was canceled.  His father was just diagnosed with an aortic aneurysm.  The patient denies any chest pains, shortness of breath, or palpitations.\par

## 2022-04-18 ENCOUNTER — APPOINTMENT (OUTPATIENT)
Dept: CARDIOLOGY | Facility: CLINIC | Age: 62
End: 2022-04-18
Payer: MEDICARE

## 2022-04-18 PROCEDURE — 93015 CV STRESS TEST SUPVJ I&R: CPT

## 2022-05-25 ENCOUNTER — APPOINTMENT (OUTPATIENT)
Dept: CARDIOLOGY | Facility: CLINIC | Age: 62
End: 2022-05-25

## 2022-10-06 ENCOUNTER — RX RENEWAL (OUTPATIENT)
Age: 62
End: 2022-10-06

## 2022-11-25 ENCOUNTER — RX RENEWAL (OUTPATIENT)
Age: 62
End: 2022-11-25

## 2022-12-16 ENCOUNTER — RX RENEWAL (OUTPATIENT)
Age: 62
End: 2022-12-16

## 2023-02-14 ENCOUNTER — LABORATORY RESULT (OUTPATIENT)
Age: 63
End: 2023-02-14

## 2023-02-14 ENCOUNTER — APPOINTMENT (OUTPATIENT)
Dept: CARDIOLOGY | Facility: CLINIC | Age: 63
End: 2023-02-14
Payer: MEDICARE

## 2023-02-14 ENCOUNTER — NON-APPOINTMENT (OUTPATIENT)
Age: 63
End: 2023-02-14

## 2023-02-14 VITALS
WEIGHT: 237 LBS | BODY MASS INDEX: 36.04 KG/M2 | HEART RATE: 58 BPM | OXYGEN SATURATION: 100 % | DIASTOLIC BLOOD PRESSURE: 81 MMHG | SYSTOLIC BLOOD PRESSURE: 149 MMHG

## 2023-02-14 PROCEDURE — 99214 OFFICE O/P EST MOD 30 MIN: CPT

## 2023-02-14 PROCEDURE — 93000 ELECTROCARDIOGRAM COMPLETE: CPT

## 2023-02-14 NOTE — DISCUSSION/SUMMARY
[EKG obtained to assist in diagnosis and management of assessed problem(s)] : EKG obtained to assist in diagnosis and management of assessed problem(s) [FreeTextEntry1] : The patient was examined. His blood pressure was 149/81 and  his pulse was 58  His lungs were clear to auscultation. Cardiac exam was negative for murmurs rubs or gallops. His EKG showed sinus bradycardia,with normal QRS complexes.No acute changes were seen. He will maintain all of his  medications.  The patient will try to cut down on caffeine and salt. and return in 12 months .  He will try to walk more and to cut out carbohydrates.    Total time spent on the day of the encounter was 34 minutes which includes  face-to-face and non face-to-face times personally spent by the physician preparing to see the patient, obtaining  separately obtained history, performing a medically appropriate exam and evaluation, counseling, educating, talking to the family or caregivers, ordering medicines, ordering tests or procedures, referring and communicating with other healthcare professionals, and documenting clinical information in the electronic health record.

## 2023-02-14 NOTE — PHYSICAL EXAM
[General Appearance - Well Developed] : well developed [Normal Appearance] : normal appearance [Well Groomed] : well groomed [No Deformities] : no deformities [General Appearance - In No Acute Distress] : no acute distress [Normal Conjunctiva] : the conjunctiva exhibited no abnormalities [Eyelids - No Xanthelasma] : the eyelids demonstrated no xanthelasmas [Normal Oral Mucosa] : normal oral mucosa [No Oral Pallor] : no oral pallor [No Oral Cyanosis] : no oral cyanosis [Normal Jugular Venous A Waves Present] : normal jugular venous A waves present [Normal Jugular Venous V Waves Present] : normal jugular venous V waves present [No Jugular Venous Worley A Waves] : no jugular venous worley A waves [Respiration, Rhythm And Depth] : normal respiratory rhythm and effort [Exaggerated Use Of Accessory Muscles For Inspiration] : no accessory muscle use [Auscultation Breath Sounds / Voice Sounds] : lungs were clear to auscultation bilaterally [Heart Rate And Rhythm] : heart rate and rhythm were normal [Heart Sounds] : normal S1 and S2 [Murmurs] : no murmurs present [Abdomen Soft] : soft [Abdomen Tenderness] : non-tender [Abdomen Mass (___ Cm)] : no abdominal mass palpated [Abnormal Walk] : normal gait [Gait - Sufficient For Exercise Testing] : the gait was sufficient for exercise testing [Cyanosis, Localized] : no localized cyanosis [Nail Clubbing] : no clubbing of the fingernails [Petechial Hemorrhages (___cm)] : no petechial hemorrhages [Skin Color & Pigmentation] : normal skin color and pigmentation [] : no rash [No Venous Stasis] : no venous stasis [Skin Lesions] : no skin lesions [No Skin Ulcers] : no skin ulcer [No Xanthoma] : no  xanthoma was observed [Oriented To Time, Place, And Person] : oriented to person, place, and time [Affect] : the affect was normal [Mood] : the mood was normal [No Anxiety] : not feeling anxious [FreeTextEntry1] : obese

## 2023-02-14 NOTE — REASON FOR VISIT
[FreeTextEntry1] : The patient comes in for followup of his hypertension, hyperlipidemia, prediabetes, and abnormal liver function tests. He has low back pain. He denies any , shortness of breath, or palpitations. Had an episode of numbness on his arms and legs and he saw his neurologist. He was given gabapentin. He gets occasional sharp shooting chest pain. Last night he had chills. This morning he has a cough and a slight fever. His wife is also sick.\par April 15, 2019: He has intentionally lost weight. He has lost 19 pounds in the past 2 months. He did this by giving out carbohydrates and pasta. He feels much better.\par August 21, 2019: The patient has been eating out a lot the past several weeks.  He has 1 to 2 cups of caffeinated coffee a day.  He denies any symptoms.  Specifically denies any chest pains, shortness of breath, or palpitations.  He does have a slight cough which does not bother him.\par December 18 2019: the patient feels occasionally fuzzy since increasing his metoprolol.\par July 28, 2020: The patient has gained weight since the COVID-19 pandemic is kept him home.  He is eating more and exercising less.  He is upset about the weight gain.  He denies any chest pains, shortness of breath, or palpitations.\par November 17, 2020: The patient has had an intentional weight loss of 10 pounds.  He is using low glucose boost supplements.  He denies any chest pains, shortness of breath, or palpitations.\par March 16, 2021: The patient has no new complaints.  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  It does distort his vision a little in that eye.  He also is complaining of some dyspnea on exertion.  He denies any chest pains, shortness of breath at rest, or palpitations.\par November 23, 2021: Patient feels well.  He accidentally did not take the increased dose of his blood pressure medicines because he was away.  He denies any chest pains, shortness of breath, or palpitations.  He never went for his stress test.  He wishes to still go.\par 2/23/2022: The patient never went for his stress test because he had a COVID-19 infection in December 22 and it was canceled.  His father was just diagnosed with an aortic aneurysm.  The patient denies any chest pains, shortness of breath, or palpitations.\par February 14, 2023: The patient has no new complaints over the past year.  He denies any shortness of breath, chest pains, or palpitations.  He wishes to lose weight and needs help with that.\par

## 2023-02-15 ENCOUNTER — TRANSCRIPTION ENCOUNTER (OUTPATIENT)
Age: 63
End: 2023-02-15

## 2023-02-15 LAB
ALBUMIN SERPL ELPH-MCNC: 4.2 G/DL
ALP BLD-CCNC: 73 U/L
ALT SERPL-CCNC: 44 U/L
ANION GAP SERPL CALC-SCNC: 13 MMOL/L
AST SERPL-CCNC: 24 U/L
BASOPHILS # BLD AUTO: 0.1 K/UL
BASOPHILS NFR BLD AUTO: 1.6 %
BILIRUB SERPL-MCNC: 0.4 MG/DL
BUN SERPL-MCNC: 21 MG/DL
CALCIUM SERPL-MCNC: 9.1 MG/DL
CHLORIDE SERPL-SCNC: 101 MMOL/L
CHOLEST SERPL-MCNC: 146 MG/DL
CO2 SERPL-SCNC: 26 MMOL/L
CREAT SERPL-MCNC: 0.96 MG/DL
EGFR: 90 ML/MIN/1.73M2
EOSINOPHIL # BLD AUTO: 0.1 K/UL
EOSINOPHIL NFR BLD AUTO: 1.6 %
ESTIMATED AVERAGE GLUCOSE: 126 MG/DL
GLUCOSE SERPL-MCNC: 105 MG/DL
HBA1C MFR BLD HPLC: 6 %
HCT VFR BLD CALC: 45.4 %
HDLC SERPL-MCNC: 42 MG/DL
HGB BLD-MCNC: 14.4 G/DL
IMM GRANULOCYTES NFR BLD AUTO: 0.6 %
LDLC SERPL CALC-MCNC: 66 MG/DL
LYMPHOCYTES # BLD AUTO: 1.66 K/UL
LYMPHOCYTES NFR BLD AUTO: 26.5 %
MAN DIFF?: NORMAL
MCHC RBC-ENTMCNC: 29.4 PG
MCHC RBC-ENTMCNC: 31.7 GM/DL
MCV RBC AUTO: 92.7 FL
MONOCYTES # BLD AUTO: 0.48 K/UL
MONOCYTES NFR BLD AUTO: 7.7 %
NEUTROPHILS # BLD AUTO: 3.88 K/UL
NEUTROPHILS NFR BLD AUTO: 62 %
NONHDLC SERPL-MCNC: 104 MG/DL
PLATELET # BLD AUTO: 187 K/UL
POTASSIUM SERPL-SCNC: 4 MMOL/L
PROT SERPL-MCNC: 6.5 G/DL
PSA FREE FLD-MCNC: 22 %
PSA FREE SERPL-MCNC: 0.27 NG/ML
PSA SERPL-MCNC: 1.24 NG/ML
RBC # BLD: 4.9 M/UL
RBC # FLD: 14.3 %
SODIUM SERPL-SCNC: 141 MMOL/L
T3RU NFR SERPL: 1.1 TBI
T4 SERPL-MCNC: 7 UG/DL
TRIGL SERPL-MCNC: 190 MG/DL
TSH SERPL-ACNC: 1.36 UIU/ML
WBC # FLD AUTO: 6.26 K/UL

## 2023-06-13 ENCOUNTER — APPOINTMENT (OUTPATIENT)
Dept: CARDIOLOGY | Facility: CLINIC | Age: 63
End: 2023-06-13
Payer: MEDICARE

## 2023-06-13 VITALS
DIASTOLIC BLOOD PRESSURE: 86 MMHG | RESPIRATION RATE: 17 BRPM | HEART RATE: 55 BPM | BODY MASS INDEX: 34.25 KG/M2 | OXYGEN SATURATION: 97 % | WEIGHT: 226 LBS | HEIGHT: 68 IN | SYSTOLIC BLOOD PRESSURE: 143 MMHG

## 2023-06-13 VITALS — DIASTOLIC BLOOD PRESSURE: 88 MMHG | SYSTOLIC BLOOD PRESSURE: 144 MMHG

## 2023-06-13 DIAGNOSIS — R06.09 OTHER FORMS OF DYSPNEA: ICD-10-CM

## 2023-06-13 DIAGNOSIS — I34.0 NONRHEUMATIC MITRAL (VALVE) INSUFFICIENCY: ICD-10-CM

## 2023-06-13 PROCEDURE — 93000 ELECTROCARDIOGRAM COMPLETE: CPT

## 2023-06-13 PROCEDURE — 99214 OFFICE O/P EST MOD 30 MIN: CPT

## 2023-06-13 NOTE — PHYSICAL EXAM
[General Appearance - Well Developed] : well developed [Normal Appearance] : normal appearance [Well Groomed] : well groomed [No Deformities] : no deformities [General Appearance - In No Acute Distress] : no acute distress [Normal Conjunctiva] : the conjunctiva exhibited no abnormalities [Normal Oral Mucosa] : normal oral mucosa [Eyelids - No Xanthelasma] : the eyelids demonstrated no xanthelasmas [No Oral Pallor] : no oral pallor [No Oral Cyanosis] : no oral cyanosis [Normal Jugular Venous A Waves Present] : normal jugular venous A waves present [Normal Jugular Venous V Waves Present] : normal jugular venous V waves present [No Jugular Venous Worley A Waves] : no jugular venous worley A waves [Respiration, Rhythm And Depth] : normal respiratory rhythm and effort [Auscultation Breath Sounds / Voice Sounds] : lungs were clear to auscultation bilaterally [Exaggerated Use Of Accessory Muscles For Inspiration] : no accessory muscle use [Heart Rate And Rhythm] : heart rate and rhythm were normal [Heart Sounds] : normal S1 and S2 [Murmurs] : no murmurs present [Abdomen Soft] : soft [Abdomen Tenderness] : non-tender [Abdomen Mass (___ Cm)] : no abdominal mass palpated [Abnormal Walk] : normal gait [Gait - Sufficient For Exercise Testing] : the gait was sufficient for exercise testing [Nail Clubbing] : no clubbing of the fingernails [Cyanosis, Localized] : no localized cyanosis [Petechial Hemorrhages (___cm)] : no petechial hemorrhages [Skin Color & Pigmentation] : normal skin color and pigmentation [] : no rash [No Venous Stasis] : no venous stasis [Skin Lesions] : no skin lesions [No Skin Ulcers] : no skin ulcer [No Xanthoma] : no  xanthoma was observed [Oriented To Time, Place, And Person] : oriented to person, place, and time [Affect] : the affect was normal [Mood] : the mood was normal [No Anxiety] : not feeling anxious [FreeTextEntry1] : obese

## 2023-06-13 NOTE — DISCUSSION/SUMMARY
[EKG obtained to assist in diagnosis and management of assessed problem(s)] : EKG obtained to assist in diagnosis and management of assessed problem(s) [FreeTextEntry1] : The patient was examined. His blood pressure was 144/88 and  his pulse was 55  His lungs were clear to auscultation. Cardiac exam was negative for murmurs rubs or gallops. He will maintain all of his  medications.  The patient will try to cut down on caffeine and salt. and return in 4   months .  He will try to walk more and to cut out carbohydrates.    Total time spent on the day of the encounter was 34 minutes which includes  face-to-face and non face-to-face times personally spent by the physician preparing to see the patient, obtaining  separately obtained history, performing a medically appropriate exam and evaluation, counseling, educating, talking to the family or caregivers, ordering medicines, ordering tests or procedures, referring and communicating with other healthcare professionals, and documenting clinical information in the electronic health record.

## 2023-06-13 NOTE — REASON FOR VISIT
[FreeTextEntry1] : The patient comes in for followup of his hypertension, hyperlipidemia, prediabetes, and abnormal liver function tests. He has low back pain. He denies any , shortness of breath, or palpitations. Had an episode of numbness on his arms and legs and he saw his neurologist. He was given gabapentin. He gets occasional sharp shooting chest pain. Last night he had chills. This morning he has a cough and a slight fever. His wife is also sick.\par April 15, 2019: He has intentionally lost weight. He has lost 19 pounds in the past 2 months. He did this by giving out carbohydrates and pasta. He feels much better.\par August 21, 2019: The patient has been eating out a lot the past several weeks.  He has 1 to 2 cups of caffeinated coffee a day.  He denies any symptoms.  Specifically denies any chest pains, shortness of breath, or palpitations.  He does have a slight cough which does not bother him.\par December 18 2019: the patient feels occasionally fuzzy since increasing his metoprolol.\par July 28, 2020: The patient has gained weight since the COVID-19 pandemic is kept him home.  He is eating more and exercising less.  He is upset about the weight gain.  He denies any chest pains, shortness of breath, or palpitations.\par November 17, 2020: The patient has had an intentional weight loss of 10 pounds.  He is using low glucose boost supplements.  He denies any chest pains, shortness of breath, or palpitations.\par March 16, 2021: The patient has no new complaints.  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  It does distort his vision a little in that eye.  He also is complaining of some dyspnea on exertion.  He denies any chest pains, shortness of breath at rest, or palpitations.\par November 23, 2021: Patient feels well.  He accidentally did not take the increased dose of his blood pressure medicines because he was away.  He denies any chest pains, shortness of breath, or palpitations.  He never went for his stress test.  He wishes to still go.\par 2/23/2022: The patient never went for his stress test because he had a COVID-19 infection in December 22 and it was canceled.  His father was just diagnosed with an aortic aneurysm.  The patient denies any chest pains, shortness of breath, or palpitations.\par February 14, 2023: The patient has no new complaints over the past year.  He denies any shortness of breath, chest pains, or palpitations.  He wishes to lose weight and needs help with that.\par June 13, 2023: The patient lost 11 pounds intentionally.  He is just watching his diet.  He denies any chest pains, shortness of breath, or palpitations.  He does not want an EKG today because 1 was done 4 months ago.  He is currently taking metoprolol tartrate 75 mg 3 times a day.

## 2023-06-13 NOTE — REVIEW OF SYSTEMS
[Dyspnea on exertion] : dyspnea during exertion [Negative] : Heme/Lymph [Weight Loss (___ Lbs)] : [unfilled] ~Ulb weight loss [SOB] : no shortness of breath [Chest Discomfort] : no chest discomfort [Palpitations] : no palpitations

## 2023-09-09 NOTE — OBJECTIVE
[History reviewed] : History reviewed. [Medications and Allergies reviewed] : Medications and allergies reviewed. Awake/Alert/Cooperative

## 2023-10-10 ENCOUNTER — APPOINTMENT (OUTPATIENT)
Dept: CARDIOLOGY | Facility: CLINIC | Age: 63
End: 2023-10-10
Payer: MEDICARE

## 2023-10-10 ENCOUNTER — NON-APPOINTMENT (OUTPATIENT)
Age: 63
End: 2023-10-10

## 2023-10-10 VITALS
BODY MASS INDEX: 33.8 KG/M2 | OXYGEN SATURATION: 99 % | HEIGHT: 68 IN | HEART RATE: 56 BPM | WEIGHT: 223 LBS | SYSTOLIC BLOOD PRESSURE: 140 MMHG | DIASTOLIC BLOOD PRESSURE: 90 MMHG

## 2023-10-10 DIAGNOSIS — R73.09 OTHER ABNORMAL GLUCOSE: ICD-10-CM

## 2023-10-10 LAB
ALBUMIN SERPL ELPH-MCNC: 4.4 G/DL
ALP BLD-CCNC: 80 U/L
ALT SERPL-CCNC: 44 U/L
ANION GAP SERPL CALC-SCNC: 9 MMOL/L
AST SERPL-CCNC: 23 U/L
BILIRUB SERPL-MCNC: 0.4 MG/DL
BUN SERPL-MCNC: 17 MG/DL
CALCIUM SERPL-MCNC: 9.4 MG/DL
CHLORIDE SERPL-SCNC: 102 MMOL/L
CHOLEST SERPL-MCNC: 166 MG/DL
CO2 SERPL-SCNC: 31 MMOL/L
CREAT SERPL-MCNC: 0.88 MG/DL
EGFR: 98 ML/MIN/1.73M2
ESTIMATED AVERAGE GLUCOSE: 117 MG/DL
GLUCOSE SERPL-MCNC: 115 MG/DL
HBA1C MFR BLD HPLC: 5.7 %
HCT VFR BLD CALC: 46.5 %
HDLC SERPL-MCNC: 50 MG/DL
HGB BLD-MCNC: 15.2 G/DL
LDLC SERPL CALC-MCNC: 84 MG/DL
MCHC RBC-ENTMCNC: 29.6 PG
MCHC RBC-ENTMCNC: 32.7 GM/DL
MCV RBC AUTO: 90.6 FL
NONHDLC SERPL-MCNC: 116 MG/DL
PLATELET # BLD AUTO: 197 K/UL
POTASSIUM SERPL-SCNC: 4.5 MMOL/L
PROT SERPL-MCNC: 6.5 G/DL
RBC # BLD: 5.13 M/UL
RBC # FLD: 14.5 %
SODIUM SERPL-SCNC: 142 MMOL/L
T4 SERPL-MCNC: 7.5 UG/DL
TRIGL SERPL-MCNC: 184 MG/DL
TSH SERPL-ACNC: 1.77 UIU/ML
WBC # FLD AUTO: 6.31 K/UL

## 2023-10-10 PROCEDURE — 99214 OFFICE O/P EST MOD 30 MIN: CPT

## 2023-10-10 PROCEDURE — 93000 ELECTROCARDIOGRAM COMPLETE: CPT

## 2023-10-18 ENCOUNTER — TRANSCRIPTION ENCOUNTER (OUTPATIENT)
Age: 63
End: 2023-10-18

## 2023-10-19 ENCOUNTER — TRANSCRIPTION ENCOUNTER (OUTPATIENT)
Age: 63
End: 2023-10-19

## 2024-03-07 ENCOUNTER — NON-APPOINTMENT (OUTPATIENT)
Age: 64
End: 2024-03-07

## 2024-03-07 ENCOUNTER — APPOINTMENT (OUTPATIENT)
Dept: CARDIOLOGY | Facility: CLINIC | Age: 64
End: 2024-03-07
Payer: MEDICARE

## 2024-03-07 VITALS
WEIGHT: 236 LBS | DIASTOLIC BLOOD PRESSURE: 88 MMHG | SYSTOLIC BLOOD PRESSURE: 140 MMHG | HEART RATE: 58 BPM | BODY MASS INDEX: 35.77 KG/M2 | OXYGEN SATURATION: 99 % | HEIGHT: 68 IN

## 2024-03-07 DIAGNOSIS — Z00.00 ENCOUNTER FOR GENERAL ADULT MEDICAL EXAMINATION W/OUT ABNORMAL FINDINGS: ICD-10-CM

## 2024-03-07 PROCEDURE — 93000 ELECTROCARDIOGRAM COMPLETE: CPT

## 2024-03-07 PROCEDURE — 99214 OFFICE O/P EST MOD 30 MIN: CPT

## 2024-03-07 PROCEDURE — G2211 COMPLEX E/M VISIT ADD ON: CPT

## 2024-03-07 PROCEDURE — 36415 COLL VENOUS BLD VENIPUNCTURE: CPT

## 2024-03-07 NOTE — PHYSICAL EXAM
[TextEntry] : General/Constitutional: WD/ WN in NAD H: NC/AT Eyes:  PERRL, sclerae and conjunctivae normal without jaundice or xanthelasma; ENMT:normal teeth, gums and palate with no petechiae, pallor or cyanosis Neck: w/o JVD, thyromegaly or adenopathy; normal venous contour Respiratory: clear to auscultation, normal respiratory effort with no retractions or use of accessory respiratory muscles Heart: [id number], regular rate, normal S1, S2 without murmurs, rubs, gallops, heaves or thrills Vascular exam: normal carotid upstrokes without carotid or abdominal bruits. 2+/2+ pulses to posterior tibialis and dorsalis pedis Abdomen: soft, nontender, bowels sounds normal without hepatomegaly or splenomegaly, masses or bruits Musculoskeletal:without significant kyphosis or scoliosis Extremities: w/o CCE, good capillary filling Skin: no stasis changes; no ulcers  Neuro: AA and O x 3; no focal neurologic deficits Psych: normal mood and affect

## 2024-03-07 NOTE — DISCUSSION/SUMMARY
[FreeTextEntry1] : Hypertension-well-controlled.   Obesity-trial of Zepbound 5 mg weekly.  Patient will maintain telephone contact over the next few months.. Hypercholesterolemia-laboratory data drawn today.  Further recommendations pending those results Elevated hemoglobin A1c-should hopefully respond to weight loss. Return visit 3 months   Total time of the encounter: 30 minutes which included but was not limited to the following: Face-to-face and non face-to-face time personally spent by the physician preparing to see the patient, obtaining and/or resuming separately obtained history, performing a medically appropriate examination and/or evaluation, counseling and educating the patient/family/caregiver, ordering medications, tests or procedures, referring and communicating with other healthcare professionals, documenting clinical information in the electronic health record, independently interpreting results and communicated results to the patient/family/caregiver and care coordination. [EKG obtained to assist in diagnosis and management of assessed problem(s)] : EKG obtained to assist in diagnosis and management of assessed problem(s)

## 2024-03-09 LAB
ALBUMIN SERPL ELPH-MCNC: 4.4 G/DL
ALP BLD-CCNC: 69 U/L
ALT SERPL-CCNC: 41 U/L
ANION GAP SERPL CALC-SCNC: 18 MMOL/L
AST SERPL-CCNC: 22 U/L
BILIRUB SERPL-MCNC: 0.6 MG/DL
BUN SERPL-MCNC: 15 MG/DL
CALCIUM SERPL-MCNC: 9.4 MG/DL
CHLORIDE SERPL-SCNC: 102 MMOL/L
CHOLEST SERPL-MCNC: 135 MG/DL
CO2 SERPL-SCNC: 24 MMOL/L
CREAT SERPL-MCNC: 0.94 MG/DL
EGFR: 92 ML/MIN/1.73M2
ESTIMATED AVERAGE GLUCOSE: 120 MG/DL
GLUCOSE SERPL-MCNC: 98 MG/DL
HBA1C MFR BLD HPLC: 5.8 %
HCT VFR BLD CALC: 46.5 %
HDLC SERPL-MCNC: 45 MG/DL
HGB BLD-MCNC: 14.5 G/DL
LDLC SERPL CALC-MCNC: 68 MG/DL
MCHC RBC-ENTMCNC: 29.8 PG
MCHC RBC-ENTMCNC: 31.2 GM/DL
MCV RBC AUTO: 95.5 FL
NONHDLC SERPL-MCNC: 90 MG/DL
PLATELET # BLD AUTO: 223 K/UL
POTASSIUM SERPL-SCNC: 4.1 MMOL/L
PROT SERPL-MCNC: 6.6 G/DL
PSA FREE FLD-MCNC: 19 %
PSA FREE SERPL-MCNC: 0.29 NG/ML
PSA SERPL-MCNC: 1.56 NG/ML
RBC # BLD: 4.87 M/UL
RBC # FLD: 14.3 %
SODIUM SERPL-SCNC: 143 MMOL/L
T4 FREE SERPL-MCNC: 1.4 NG/DL
TRIGL SERPL-MCNC: 121 MG/DL
TSH SERPL-ACNC: 1.49 UIU/ML
WBC # FLD AUTO: 5.54 K/UL

## 2024-03-12 RX ORDER — TIRZEPATIDE 5 MG/.5ML
5 INJECTION, SOLUTION SUBCUTANEOUS
Qty: 1 | Refills: 3 | Status: DISCONTINUED | COMMUNITY
Start: 2024-03-07 | End: 2024-03-12

## 2024-03-22 RX ORDER — METOPROLOL TARTRATE 100 MG/1
100 TABLET, FILM COATED ORAL
Qty: 180 | Refills: 3 | Status: ACTIVE | COMMUNITY
Start: 2020-08-16 | End: 1900-01-01

## 2024-03-25 RX ORDER — VALSARTAN AND HYDROCHLOROTHIAZIDE 160; 25 MG/1; MG/1
160-25 TABLET, FILM COATED ORAL
Qty: 90 | Refills: 3 | Status: DISCONTINUED | COMMUNITY
Start: 2023-10-10 | End: 2024-03-25

## 2024-03-28 ENCOUNTER — NON-APPOINTMENT (OUTPATIENT)
Age: 64
End: 2024-03-28

## 2024-03-29 RX ORDER — AZILSARTAN KAMEDOXOMIL AND CHLORTHALIDONE 40; 25 MG/1; MG/1
40-25 TABLET ORAL
Qty: 90 | Refills: 3 | Status: ACTIVE | COMMUNITY
Start: 2024-03-25 | End: 1900-01-01

## 2024-07-02 ENCOUNTER — NON-APPOINTMENT (OUTPATIENT)
Age: 64
End: 2024-07-02

## 2024-07-02 ENCOUNTER — APPOINTMENT (OUTPATIENT)
Dept: CARDIOLOGY | Facility: CLINIC | Age: 64
End: 2024-07-02
Payer: MEDICARE

## 2024-07-02 VITALS
SYSTOLIC BLOOD PRESSURE: 110 MMHG | WEIGHT: 228 LBS | OXYGEN SATURATION: 98 % | HEIGHT: 68 IN | DIASTOLIC BLOOD PRESSURE: 80 MMHG | BODY MASS INDEX: 34.56 KG/M2 | HEART RATE: 71 BPM

## 2024-07-02 DIAGNOSIS — E66.9 OBESITY, UNSPECIFIED: ICD-10-CM

## 2024-07-02 DIAGNOSIS — E78.5 HYPERLIPIDEMIA, UNSPECIFIED: ICD-10-CM

## 2024-07-02 DIAGNOSIS — I10 ESSENTIAL (PRIMARY) HYPERTENSION: ICD-10-CM

## 2024-07-02 PROCEDURE — 99214 OFFICE O/P EST MOD 30 MIN: CPT

## 2024-07-02 PROCEDURE — G2211 COMPLEX E/M VISIT ADD ON: CPT

## 2024-07-02 PROCEDURE — 93000 ELECTROCARDIOGRAM COMPLETE: CPT

## 2024-07-08 RX ORDER — TIRZEPATIDE 5 MG/.5ML
5 INJECTION, SOLUTION SUBCUTANEOUS
Qty: 1 | Refills: 3 | Status: ACTIVE | COMMUNITY
Start: 2024-07-02 | End: 1900-01-01

## 2024-07-23 ENCOUNTER — NON-APPOINTMENT (OUTPATIENT)
Age: 64
End: 2024-07-23

## 2024-07-31 ENCOUNTER — NON-APPOINTMENT (OUTPATIENT)
Age: 64
End: 2024-07-31

## 2024-07-31 ENCOUNTER — APPOINTMENT (OUTPATIENT)
Dept: CARDIOLOGY | Facility: CLINIC | Age: 64
End: 2024-07-31
Payer: MEDICARE

## 2024-07-31 VITALS
DIASTOLIC BLOOD PRESSURE: 70 MMHG | SYSTOLIC BLOOD PRESSURE: 120 MMHG | HEART RATE: 100 BPM | BODY MASS INDEX: 32.89 KG/M2 | OXYGEN SATURATION: 98 % | HEIGHT: 68 IN | WEIGHT: 217 LBS

## 2024-07-31 DIAGNOSIS — I10 ESSENTIAL (PRIMARY) HYPERTENSION: ICD-10-CM

## 2024-07-31 DIAGNOSIS — R63.4 ABNORMAL WEIGHT LOSS: ICD-10-CM

## 2024-07-31 DIAGNOSIS — R42 DIZZINESS AND GIDDINESS: ICD-10-CM

## 2024-07-31 PROCEDURE — G2211 COMPLEX E/M VISIT ADD ON: CPT

## 2024-07-31 PROCEDURE — 99213 OFFICE O/P EST LOW 20 MIN: CPT

## 2024-07-31 NOTE — VITALS
[TextEntry] : Blood pressure 100/80 standing.  After prolonged bending down and getting up the patient did have an episode of dizziness with associated with blood pressure of 90/70 which lasted for several seconds.

## 2024-07-31 NOTE — DISCUSSION/SUMMARY
[FreeTextEntry1] : Hypertension-with the patient's weight loss, and change in diet (he is also eating much less salt) his blood pressure is improving significantly.  When we spoke yesterday about the dizziness episodes, I recommend that he discontinue metoprolol which she did.  Will continue to monitor this.  I suspect that as his blood pressure rises slowly even mild aesthetic changes will become asymptomatic.  Might also consider discontinuing diuretic.  Patient will check blood pressures at home and get back to me with readings.  Further recommendations thereafter. Obesity-continue Zepbound 5 mg weekly for now since weight loss is continuing. Return visit 4 months.  Labs at next visit.   Total time of the encounter: 20 minutes which included but was not limited to the following: Face-to-face and non face-to-face time personally spent by the physician preparing to see the patient, obtaining and/or resuming separately obtained history, performing a medically appropriate examination and/or evaluation, counseling and educating the patient/family/caregiver, ordering medications, tests or procedures, referring and communicating with other healthcare professionals, documenting clinical information in the electronic health record, independently interpreting results and communicated results to the patient/family/caregiver and care coordination.

## 2024-07-31 NOTE — REVIEW OF SYSTEMS
[TextEntry] : Except as noted above... Constitutional: The patient denied headache, fatigue, fever, sweats, loss of appetite or chills Eyes: The patient denied double vision, eye pain, eye discharge, red eyes or itchy eyes ENT: The patient denied ear pain, ear discharge, nasal congestion, nasal discharge, sore throat, enlarged tonsils, hoarseness, neck pain or neck swelling Cardiovascular: The patient denied chest pain, chest discomfort, dizziness, palpitations, fainting  or leg cramps Respiratory: The patient denied shortness of breath, cough, coughing up blood, wheezing, chest congestion or mucous production GI: The patient denied  abdominal pain, nausea, vomiting, diarrhea, constipation, black stools or bloody stools : The patient denied pain on urination, burning with urination, frequent urination or blood in the urine Skin: The patient denied rashes, redness or swelling Neurologic: The patient denied headache, stiff neck, weakness, numbness, difficulty speaking, unsteadiness or numbness/tingling in feet Psychiatric: The patient denied hallucinations, agitation or disorientation Endocrine: The patient denied excessive thirst, excessive urination, cold intolerance or heat intolerance Hematologic: The patient denied easy bruisability or pallor Allergic/Immunologic: The patient denied runny nose, recurrent infections, hives or pruritis Musculoskeletal: The patient denied arthritic pains, muscle weakness or muscle aches Extremities: The patient denied clubbing, cyanosis or lower extremity swelling

## 2024-07-31 NOTE — REASON FOR VISIT
[FreeTextEntry1] : The patient is here today for follow-up of hypertension.  The patient is down 19 pounds since beginning Zepbound and he feels remarkably better.  In addition, the patient has modified his lifestyle and is eating in a much more healthy manner.  He recently returned from a cruise.  Since returning, the patient has noted episodes of orthostatic dizziness.  When he checks his blood pressure is often in the 100/80 range.  The episodes of dizziness are very brief in nature and go away within seconds.  He also has history of vertigo which is different than the symptoms.

## 2024-07-31 NOTE — PHYSICAL EXAM
[TextEntry] : General/Constitutional: WD/ WN in NAD H: NC/AT Eyes:  PERRL, sclerae and conjunctivae normal without jaundice or xanthelasma; ENMT:normal teeth, gums and palate with no petechiae, pallor or cyanosis Neck: w/o JVD, thyromegaly or adenopathy; normal venous contour Respiratory: clear to auscultation, normal respiratory effort with no retractions or use of accessory respiratory muscles Heart: [id number], regular rate, normal S1, S2 without murmurs, rubs, gallops, heaves or thrills Vascular exam: normal carotid upstrokes without carotid or abdominal bruits. 2+/2+ pulses to posterior tibialis and dorsalis pedis Abdomen: soft, nontender, bowels sounds normal without hepatomegaly or splenomegaly, masses or bruits Musculoskeletal:without significant kyphosis or scoliosis Extremities: w/o CCE, good capillary filling Skin: no stasis changes; no ulcers  Neuro: AA and O x 3; no focal neurologic deficits Psych: normal mood and affect

## 2024-09-02 ENCOUNTER — NON-APPOINTMENT (OUTPATIENT)
Age: 64
End: 2024-09-02

## 2024-09-11 RX ORDER — TIRZEPATIDE 10 MG/.5ML
10 INJECTION, SOLUTION SUBCUTANEOUS
Qty: 3 | Refills: 3 | Status: ACTIVE | COMMUNITY
Start: 2024-09-03 | End: 1900-01-01

## 2024-10-30 ENCOUNTER — NON-APPOINTMENT (OUTPATIENT)
Age: 64
End: 2024-10-30

## 2024-12-03 NOTE — REVIEW OF SYSTEMS
Pt called SW to find out if a Proof of Disability form has been recv'd at this clinic from the State of [place].    SW explained this clinic has not recv'd the letter.  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     Pt stated he will contact the sender of the form and asked if he could email documents to SW.  SW stated that is fine (and provided email address) but also stated SW will respond to pt via Isabella Olivert, pt understood.     Jazmine Gregg, MSW, LGSW  [facility]     [Dyspnea on exertion] : dyspnea during exertion [Negative] : Heme/Lymph [SOB] : no shortness of breath [Chest Discomfort] : no chest discomfort [Palpitations] : no palpitations

## 2024-12-24 ENCOUNTER — NON-APPOINTMENT (OUTPATIENT)
Age: 64
End: 2024-12-24

## 2024-12-24 ENCOUNTER — APPOINTMENT (OUTPATIENT)
Dept: CARDIOLOGY | Facility: CLINIC | Age: 64
End: 2024-12-24
Payer: MEDICARE

## 2024-12-24 VITALS
HEIGHT: 68 IN | OXYGEN SATURATION: 98 % | WEIGHT: 183 LBS | HEART RATE: 74 BPM | SYSTOLIC BLOOD PRESSURE: 110 MMHG | DIASTOLIC BLOOD PRESSURE: 70 MMHG | BODY MASS INDEX: 27.74 KG/M2

## 2024-12-24 DIAGNOSIS — I10 ESSENTIAL (PRIMARY) HYPERTENSION: ICD-10-CM

## 2024-12-24 DIAGNOSIS — R73.03 PREDIABETES.: ICD-10-CM

## 2024-12-24 DIAGNOSIS — Z00.00 ENCOUNTER FOR GENERAL ADULT MEDICAL EXAMINATION W/OUT ABNORMAL FINDINGS: ICD-10-CM

## 2024-12-24 DIAGNOSIS — E66.9 OBESITY, UNSPECIFIED: ICD-10-CM

## 2024-12-24 DIAGNOSIS — E78.5 HYPERLIPIDEMIA, UNSPECIFIED: ICD-10-CM

## 2024-12-24 PROCEDURE — G2211 COMPLEX E/M VISIT ADD ON: CPT

## 2024-12-24 PROCEDURE — 99214 OFFICE O/P EST MOD 30 MIN: CPT

## 2024-12-24 PROCEDURE — 93000 ELECTROCARDIOGRAM COMPLETE: CPT

## 2024-12-27 LAB
ALBUMIN SERPL ELPH-MCNC: 4.2 G/DL
ALP BLD-CCNC: 73 U/L
ALT SERPL-CCNC: 53 U/L
ANION GAP SERPL CALC-SCNC: 13 MMOL/L
AST SERPL-CCNC: 30 U/L
BASOPHILS # BLD AUTO: 0.07 K/UL
BASOPHILS NFR BLD AUTO: 1.5 %
BILIRUB SERPL-MCNC: 0.5 MG/DL
BUN SERPL-MCNC: 16 MG/DL
CALCIUM SERPL-MCNC: 9.5 MG/DL
CHLORIDE SERPL-SCNC: 100 MMOL/L
CHOLEST SERPL-MCNC: 120 MG/DL
CO2 SERPL-SCNC: 26 MMOL/L
CREAT SERPL-MCNC: 0.97 MG/DL
EGFR: 88 ML/MIN/1.73M2
EOSINOPHIL # BLD AUTO: 0.06 K/UL
EOSINOPHIL NFR BLD AUTO: 1.3 %
ESTIMATED AVERAGE GLUCOSE: 111 MG/DL
GLUCOSE SERPL-MCNC: 89 MG/DL
HBA1C MFR BLD HPLC: 5.5 %
HCT VFR BLD CALC: 45.1 %
HDLC SERPL-MCNC: 46 MG/DL
HGB BLD-MCNC: 14.2 G/DL
IMM GRANULOCYTES NFR BLD AUTO: 0.2 %
LDLC SERPL CALC-MCNC: 57 MG/DL
LYMPHOCYTES # BLD AUTO: 1.09 K/UL
LYMPHOCYTES NFR BLD AUTO: 23 %
MAN DIFF?: NORMAL
MCHC RBC-ENTMCNC: 28.5 PG
MCHC RBC-ENTMCNC: 31.5 G/DL
MCV RBC AUTO: 90.6 FL
MONOCYTES # BLD AUTO: 0.31 K/UL
MONOCYTES NFR BLD AUTO: 6.5 %
NEUTROPHILS # BLD AUTO: 3.2 K/UL
NEUTROPHILS NFR BLD AUTO: 67.5 %
NONHDLC SERPL-MCNC: 74 MG/DL
PLATELET # BLD AUTO: 190 K/UL
POTASSIUM SERPL-SCNC: 4.9 MMOL/L
PROT SERPL-MCNC: 6.6 G/DL
RBC # BLD: 4.98 M/UL
RBC # FLD: 15.2 %
SODIUM SERPL-SCNC: 139 MMOL/L
T4 FREE SERPL-MCNC: 1.3 NG/DL
TRIGL SERPL-MCNC: 87 MG/DL
TSH SERPL-ACNC: 0.85 UIU/ML
WBC # FLD AUTO: 4.74 K/UL

## 2025-01-18 ENCOUNTER — RX RENEWAL (OUTPATIENT)
Age: 65
End: 2025-01-18

## 2025-04-25 ENCOUNTER — NON-APPOINTMENT (OUTPATIENT)
Age: 65
End: 2025-04-25

## 2025-06-24 ENCOUNTER — APPOINTMENT (OUTPATIENT)
Dept: CARDIOLOGY | Facility: CLINIC | Age: 65
End: 2025-06-24
Payer: MEDICARE

## 2025-06-24 ENCOUNTER — NON-APPOINTMENT (OUTPATIENT)
Age: 65
End: 2025-06-24

## 2025-06-24 VITALS
DIASTOLIC BLOOD PRESSURE: 72 MMHG | BODY MASS INDEX: 26.15 KG/M2 | SYSTOLIC BLOOD PRESSURE: 120 MMHG | HEART RATE: 72 BPM | WEIGHT: 172 LBS

## 2025-06-24 PROCEDURE — G2211 COMPLEX E/M VISIT ADD ON: CPT

## 2025-06-24 PROCEDURE — 99214 OFFICE O/P EST MOD 30 MIN: CPT

## 2025-06-24 PROCEDURE — 93000 ELECTROCARDIOGRAM COMPLETE: CPT

## 2025-06-24 PROCEDURE — 36415 COLL VENOUS BLD VENIPUNCTURE: CPT

## 2025-06-25 ENCOUNTER — APPOINTMENT (OUTPATIENT)
Dept: CARDIOLOGY | Facility: CLINIC | Age: 65
End: 2025-06-25

## 2025-06-25 LAB
ALBUMIN SERPL ELPH-MCNC: 4.2 G/DL
ALP BLD-CCNC: 68 U/L
ALT SERPL-CCNC: 92 U/L
ANION GAP SERPL CALC-SCNC: 13 MMOL/L
AST SERPL-CCNC: 34 U/L
BASOPHILS # BLD AUTO: 0.05 K/UL
BASOPHILS NFR BLD AUTO: 0.9 %
BILIRUB SERPL-MCNC: 0.6 MG/DL
BUN SERPL-MCNC: 20 MG/DL
CALCIUM SERPL-MCNC: 9.6 MG/DL
CHLORIDE SERPL-SCNC: 102 MMOL/L
CHOLEST SERPL-MCNC: 146 MG/DL
CO2 SERPL-SCNC: 24 MMOL/L
CREAT SERPL-MCNC: 0.88 MG/DL
EGFRCR SERPLBLD CKD-EPI 2021: 97 ML/MIN/1.73M2
EOSINOPHIL # BLD AUTO: 0.02 K/UL
EOSINOPHIL NFR BLD AUTO: 0.4 %
ESTIMATED AVERAGE GLUCOSE: 108 MG/DL
GLUCOSE SERPL-MCNC: 83 MG/DL
HBA1C MFR BLD HPLC: 5.4 %
HCT VFR BLD CALC: 45.5 %
HDLC SERPL-MCNC: 76 MG/DL
HGB BLD-MCNC: 14.6 G/DL
IMM GRANULOCYTES NFR BLD AUTO: 0.2 %
LDLC SERPL-MCNC: 57 MG/DL
LYMPHOCYTES # BLD AUTO: 1.01 K/UL
LYMPHOCYTES NFR BLD AUTO: 18.3 %
MAN DIFF?: NORMAL
MCHC RBC-ENTMCNC: 29.9 PG
MCHC RBC-ENTMCNC: 32.1 G/DL
MCV RBC AUTO: 93.2 FL
MONOCYTES # BLD AUTO: 0.33 K/UL
MONOCYTES NFR BLD AUTO: 6 %
NEUTROPHILS # BLD AUTO: 4.09 K/UL
NEUTROPHILS NFR BLD AUTO: 74.2 %
NONHDLC SERPL-MCNC: 69 MG/DL
PLATELET # BLD AUTO: 160 K/UL
POTASSIUM SERPL-SCNC: 4.2 MMOL/L
PROT SERPL-MCNC: 6.5 G/DL
PSA FREE FLD-MCNC: 22 %
PSA FREE SERPL-MCNC: 0.28 NG/ML
PSA SERPL-MCNC: 1.27 NG/ML
RBC # BLD: 4.88 M/UL
RBC # FLD: 16.1 %
SODIUM SERPL-SCNC: 140 MMOL/L
T4 FREE SERPL-MCNC: 1.5 NG/DL
TRIGL SERPL-MCNC: 57 MG/DL
TSH SERPL-ACNC: 0.61 UIU/ML
WBC # FLD AUTO: 5.51 K/UL

## 2025-06-25 RX ORDER — ORAL SEMAGLUTIDE 7 MG/1
7 TABLET ORAL
Qty: 90 | Refills: 1 | Status: ACTIVE | COMMUNITY
Start: 2025-06-24 | End: 1900-01-01

## 2025-07-06 ENCOUNTER — NON-APPOINTMENT (OUTPATIENT)
Age: 65
End: 2025-07-06